# Patient Record
Sex: MALE | URBAN - METROPOLITAN AREA
[De-identification: names, ages, dates, MRNs, and addresses within clinical notes are randomized per-mention and may not be internally consistent; named-entity substitution may affect disease eponyms.]

---

## 2023-05-13 DIAGNOSIS — E11.9 TYPE 2 DIABETES MELLITUS WITHOUT COMPLICATIONS (MULTI): ICD-10-CM

## 2023-05-15 RX ORDER — FLASH GLUCOSE SCANNING READER
EACH MISCELLANEOUS
COMMUNITY
Start: 2023-01-19 | End: 2024-05-09 | Stop reason: ALTCHOICE

## 2023-05-15 RX ORDER — METFORMIN HYDROCHLORIDE 500 MG/1
500 TABLET ORAL
COMMUNITY
End: 2023-08-17 | Stop reason: SDUPTHER

## 2023-05-15 RX ORDER — FLASH GLUCOSE SENSOR
KIT MISCELLANEOUS
COMMUNITY
Start: 2023-04-23 | End: 2023-07-10 | Stop reason: SDUPTHER

## 2023-05-15 RX ORDER — GLIPIZIDE 10 MG/1
TABLET ORAL
COMMUNITY
End: 2023-07-10 | Stop reason: SDUPTHER

## 2023-05-15 RX ORDER — BLOOD SUGAR DIAGNOSTIC
STRIP MISCELLANEOUS
COMMUNITY
Start: 2020-05-29 | End: 2023-10-26 | Stop reason: SDUPTHER

## 2023-05-15 RX ORDER — SITAGLIPTIN 100 MG/1
100 TABLET, FILM COATED ORAL DAILY
COMMUNITY
End: 2023-07-10 | Stop reason: ALTCHOICE

## 2023-05-15 RX ORDER — ATORVASTATIN CALCIUM 10 MG/1
10 TABLET, FILM COATED ORAL DAILY
COMMUNITY
Start: 2023-04-23 | End: 2023-11-01 | Stop reason: SDUPTHER

## 2023-05-15 RX ORDER — LINAGLIPTIN 5 MG/1
5 TABLET, FILM COATED ORAL DAILY PRN
COMMUNITY
Start: 2023-04-23 | End: 2023-07-10 | Stop reason: SDUPTHER

## 2023-05-15 RX ORDER — GLIPIZIDE 10 MG/1
10 TABLET ORAL
Qty: 180 TABLET | Refills: 0 | Status: SHIPPED | OUTPATIENT
Start: 2023-05-15 | End: 2023-08-11

## 2023-05-15 RX ORDER — EPINEPHRINE 0.22MG
1 AEROSOL WITH ADAPTER (ML) INHALATION DAILY
COMMUNITY

## 2023-05-15 NOTE — TELEPHONE ENCOUNTER
Requested Prescriptions     Pending Prescriptions Disp Refills    glipiZIDE (Glucotrol) 10 mg tablet [Pharmacy Med Name: glipizide 10 mg tablet] 60 tablet 0     Sig: Take 1 tablet (10 mg) by mouth 2 times a day before meals.

## 2023-07-10 ENCOUNTER — OFFICE VISIT (OUTPATIENT)
Dept: PRIMARY CARE | Facility: CLINIC | Age: 63
End: 2023-07-10
Payer: COMMERCIAL

## 2023-07-10 ENCOUNTER — LAB (OUTPATIENT)
Dept: LAB | Facility: LAB | Age: 63
End: 2023-07-10
Payer: COMMERCIAL

## 2023-07-10 VITALS
HEART RATE: 67 BPM | OXYGEN SATURATION: 97 % | TEMPERATURE: 97.5 F | DIASTOLIC BLOOD PRESSURE: 83 MMHG | WEIGHT: 186.2 LBS | SYSTOLIC BLOOD PRESSURE: 129 MMHG | BODY MASS INDEX: 27.5 KG/M2 | RESPIRATION RATE: 16 BRPM

## 2023-07-10 DIAGNOSIS — E11.9 TYPE 2 DIABETES MELLITUS WITHOUT COMPLICATION, WITHOUT LONG-TERM CURRENT USE OF INSULIN (MULTI): ICD-10-CM

## 2023-07-10 DIAGNOSIS — E11.9 TYPE 2 DIABETES MELLITUS WITHOUT COMPLICATION, WITHOUT LONG-TERM CURRENT USE OF INSULIN (MULTI): Primary | ICD-10-CM

## 2023-07-10 LAB
ESTIMATED AVERAGE GLUCOSE FOR HBA1C: 148 MG/DL
HEMOGLOBIN A1C/HEMOGLOBIN TOTAL IN BLOOD: 6.8 %

## 2023-07-10 PROCEDURE — 99213 OFFICE O/P EST LOW 20 MIN: CPT | Performed by: FAMILY MEDICINE

## 2023-07-10 PROCEDURE — 36415 COLL VENOUS BLD VENIPUNCTURE: CPT

## 2023-07-10 PROCEDURE — 83036 HEMOGLOBIN GLYCOSYLATED A1C: CPT

## 2023-07-10 RX ORDER — FLASH GLUCOSE SENSOR
KIT MISCELLANEOUS
Qty: 6 EACH | Refills: 1 | Status: SHIPPED | OUTPATIENT
Start: 2023-07-10 | End: 2023-09-18 | Stop reason: SDUPTHER

## 2023-07-10 RX ORDER — LINAGLIPTIN 5 MG/1
5 TABLET, FILM COATED ORAL DAILY
Qty: 90 TABLET | Refills: 1 | Status: SHIPPED | OUTPATIENT
Start: 2023-07-10 | End: 2024-01-22 | Stop reason: SDUPTHER

## 2023-07-10 NOTE — PROGRESS NOTES
Subjective   Patient ID: Haroon Snowden is a 63 y.o. male who presents for Follow-up (dm).    HPI   Patient comes in today for 6-month follow-up and refill of medications.  He currently is without complaints.  He is using the freestyle anselmo and claims his sugars have been doing quite well.  He is otherwise without complaints.        1/19/2023  Patient comes in today for physical exam. He currently is without complaints. He has a new health insurance as of the beginning of the month. He is doing a lot of extra work at work as he lost 2 coworkers as well as his boss. He is having to  the work for all 3 of them.    Patient would like to try intermittent fasting and a continuous glucose monitor.    11/28/2022  Patient comes in today for checkup and refill of his medication. He is scheduled to come back next month for physical exam. He currently is without complaints and would like to get his lab work done. He is not fasting today.    5/23/2022  Patient presents today for 6-month checkup and refill of meds. He currently is without complaints. He states that he is taking his medicines as directed and is not having any side effects from them. He just got back from a weekend visiting a 100-year-old aunt in [place].    11/10/2021  Patient comes in today for physical exam. He has had some issues with his urine and some diarrhea in the past few weeks but they seem to be subsiding. He tried to give a urine today but was unsuccessful. Patient is also concerned about possible hearing issues.    8/11/2021  Patient presents today for 6-month checkup and refill of meds. He currently is without complaints. He states that he is taking his medicines as directed and is not having any side effects from them. His hemoglobin A1c today is 8.3% down slightly from the 8.8 back in February. He has not been taking the Metformin, will add this back.     02/04/2021  Patient comes in today for follow-up on his diabetes and prostate. He  feels his sugars have been about 20% higher on average than usual due to lack of Januvia which he ran out of and also eating more carbs than usual and not getting exercise like he normally would due to Covid. He otherwise is without complaints.    11/2/2020  Patient comes in today for a physical exam. He states that around Labor Day and then 2 weeks ago and now he has been having some problems with diarrhea, bloating, gas and nausea. He doesn't know why. He denies fever or chills.    6/17/2020  Patient presents today for his a1c check and foot lump on his right foot. His A1c today is doing better at 7.9% down from 10% back in February. He is feeling better. Overall he has been doing well and so far has not been affected by the coronavirus. He had twin granddaughters born at the end of April.     4/13/2020  Patient is seen today as a telemedicine visit rendered via realtime interactive audio/video [email or web address] services as we are currently in the middle of a coronavirus pandemic worldwide with stay-at-home orders by the government. Patient presents today for checkup and refill of meds. He currently is without complaints. He states that he is taking his medicines as directed and is not having any side effects from them. He states the new medication changes have improved his blood sugars to 160-170 in the morning and  in the evening. He just had a hemoglobin A1c of 10.0 in February. We'll recheck it again in 3 months when hopefully the pandemic has quieted.    2/10/2020  Patient comes in today complaining of upper respiratory symptoms. He's had sneezing and a semi-productive cough for about a week now. He has been using Tessalon and Mucinex DM with little relief. He also has some grumbling in his stomach but no nausea or diarrhea. He denies earache, sore throat or fever.    Patient is also here today to have his sugars checked. They have been running high at home usually over 200 in the morning. He admits that he  "hasn't been watching his diet.    10/30/19  Patient comes in today for a wellness physical exam. He is currently without complaints. He has a new grandson. He recently moved his office at work. He claims his sugars have been higher than usual because of poor food choices. He claims it has been a \"decent exercise year\". His weight has been holding in the 180-186 range.     Review of Systems   All other systems reviewed and are negative.      Objective   /83   Pulse 67   Temp 36.4 °C (97.5 °F)   Resp 16   Wt 98.9 kg (218 lb)   SpO2 97%   BMI 32.19 kg/m²     Physical Exam  Vitals reviewed.   Constitutional:       Appearance: He is well-developed.   HENT:      Head: Normocephalic and atraumatic.      Right Ear: Tympanic membrane, ear canal and external ear normal.      Left Ear: Tympanic membrane, ear canal and external ear normal.      Nose: Nose normal.      Mouth/Throat:      Mouth: Mucous membranes are moist.      Pharynx: Oropharynx is clear.   Eyes:      Extraocular Movements: Extraocular movements intact.      Conjunctiva/sclera: Conjunctivae normal.      Pupils: Pupils are equal, round, and reactive to light.   Cardiovascular:      Rate and Rhythm: Normal rate and regular rhythm.      Heart sounds: Normal heart sounds. No murmur heard.  Pulmonary:      Effort: Pulmonary effort is normal.      Breath sounds: Normal breath sounds. No wheezing.   Abdominal:      General: Abdomen is flat. Bowel sounds are normal.      Palpations: Abdomen is soft.   Musculoskeletal:         General: Normal range of motion.   Skin:     General: Skin is warm and dry.   Neurological:      General: No focal deficit present.      Mental Status: He is alert and oriented to person, place, and time. Mental status is at baseline.   Psychiatric:         Mood and Affect: Mood normal.         Behavior: Behavior normal.         Thought Content: Thought content normal.         Judgment: Judgment normal.         Assessment/Plan "   Problem List Items Addressed This Visit    None  Visit Diagnoses       Type 2 diabetes mellitus without complication, without long-term current use of insulin (CMS/[facility])    -  Primary    Relevant Medications    linaGLIPtin (Tradjenta) 5 mg tablet    FreeStyle Med sensor system (FreeStyle Med 2 Sensor) kit    Other Relevant Orders    Hemoglobin A1C        Will contact patient with lab results when available.  Return to clinic in the future for complete physical exam and fasting labs.  Medication list reconciled.  Thank you for visiting today!      Professional services: Some of this note was completed using Dragon voice technology and sometimes the software misinterprets words. This may include unintended errors with respect to translation of words, typographical errors or grammar errors which may not have been identified prior to finalization of the chart note. Please take this into account when reading the note. Thank you.

## 2023-08-10 DIAGNOSIS — E11.9 TYPE 2 DIABETES MELLITUS WITHOUT COMPLICATIONS (MULTI): ICD-10-CM

## 2023-08-11 RX ORDER — GLIPIZIDE 10 MG/1
10 TABLET ORAL
Qty: 180 TABLET | Refills: 1 | Status: SHIPPED | OUTPATIENT
Start: 2023-08-11 | End: 2024-01-22 | Stop reason: SDUPTHER

## 2023-08-11 NOTE — TELEPHONE ENCOUNTER
Requested Prescriptions     Pending Prescriptions Disp Refills    glipiZIDE (Glucotrol) 10 mg tablet [Pharmacy Med Name: glipizide 10 mg tablet] 180 tablet 1     Sig: Take 1 tablet (10 mg) by mouth 2 times a day before meals.

## 2023-08-17 DIAGNOSIS — E11.9 TYPE 2 DIABETES MELLITUS WITHOUT COMPLICATION, WITHOUT LONG-TERM CURRENT USE OF INSULIN (MULTI): Primary | ICD-10-CM

## 2023-08-17 RX ORDER — METFORMIN HYDROCHLORIDE 500 MG/1
500 TABLET ORAL
Qty: 180 TABLET | Refills: 1 | Status: SHIPPED | OUTPATIENT
Start: 2023-08-17 | End: 2024-01-22 | Stop reason: SDUPTHER

## 2023-08-17 NOTE — TELEPHONE ENCOUNTER
Requested Prescriptions     Pending Prescriptions Disp Refills    metFORMIN (Glucophage) 500 mg tablet 180 tablet 1     Sig: Take 1 tablet (500 mg) by mouth 2 times a day after meals.

## 2023-08-17 NOTE — TELEPHONE ENCOUNTER
Patient is requesting a refill on his     Metformin  500 mg     Sent to [facility] in [place]    Thank You

## 2023-09-18 ENCOUNTER — TELEPHONE (OUTPATIENT)
Dept: PRIMARY CARE | Facility: CLINIC | Age: 63
End: 2023-09-18
Payer: COMMERCIAL

## 2023-09-18 DIAGNOSIS — E11.9 TYPE 2 DIABETES MELLITUS WITHOUT COMPLICATION, WITHOUT LONG-TERM CURRENT USE OF INSULIN (MULTI): ICD-10-CM

## 2023-09-18 RX ORDER — FLASH GLUCOSE SENSOR
KIT MISCELLANEOUS
Qty: 6 EACH | Refills: 1 | Status: SHIPPED | OUTPATIENT
Start: 2023-09-18 | End: 2024-03-12

## 2023-09-18 NOTE — TELEPHONE ENCOUNTER
Patient is requesting a refill on his       Freestyle Med 2 sensor      Sent to [facility] in [place]      Thank You

## 2023-09-18 NOTE — TELEPHONE ENCOUNTER
Requested Prescriptions     Pending Prescriptions Disp Refills    FreeStyle Med sensor system (FreeStyle Med 2 Sensor) kit 6 each 1     Sig: use 1 sensor as directed every 14 days

## 2023-10-26 DIAGNOSIS — E11.9 TYPE 2 DIABETES MELLITUS WITHOUT COMPLICATION, WITHOUT LONG-TERM CURRENT USE OF INSULIN (MULTI): ICD-10-CM

## 2023-10-26 RX ORDER — BLOOD SUGAR DIAGNOSTIC
STRIP MISCELLANEOUS
Qty: 100 STRIP | Refills: 3 | Status: SHIPPED | OUTPATIENT
Start: 2023-10-26

## 2023-10-26 NOTE — TELEPHONE ENCOUNTER
Requested Prescriptions     Pending Prescriptions Disp Refills    blood sugar diagnostic (Accu-Chek Guide test strips) strip 100 strip 3     Sig: USE TO TEST BLOOD SUGAR TWICE DAILY

## 2023-10-26 NOTE — TELEPHONE ENCOUNTER
PT needs refill for...    Accu-Chek Guide test strips strip       Please send to [facility]    Thank you

## 2023-11-01 ENCOUNTER — CLINICAL SUPPORT (OUTPATIENT)
Dept: PRIMARY CARE | Facility: CLINIC | Age: 63
End: 2023-11-01
Payer: COMMERCIAL

## 2023-11-01 DIAGNOSIS — Z23 INFLUENZA VACCINE NEEDED: ICD-10-CM

## 2023-11-01 DIAGNOSIS — E78.5 HYPERLIPIDEMIA, UNSPECIFIED HYPERLIPIDEMIA TYPE: ICD-10-CM

## 2023-11-01 PROCEDURE — 90686 IIV4 VACC NO PRSV 0.5 ML IM: CPT | Performed by: FAMILY MEDICINE

## 2023-11-01 PROCEDURE — 90471 IMMUNIZATION ADMIN: CPT | Performed by: FAMILY MEDICINE

## 2023-11-01 NOTE — TELEPHONE ENCOUNTER
Patient requests prescription below    Last Office Visit: 7/10/23    Patient also requesting annual lab orders placed so he can have drawn prior to his physical in january.    Please review and advise    Requested Prescriptions     Pending Prescriptions Disp Refills    atorvastatin (Lipitor) 10 mg tablet 30 tablet 2     Sig: Take 1 tablet (10 mg) by mouth once daily.

## 2023-11-02 RX ORDER — ATORVASTATIN CALCIUM 10 MG/1
10 TABLET, FILM COATED ORAL DAILY
Qty: 30 TABLET | Refills: 2 | Status: SHIPPED | OUTPATIENT
Start: 2023-11-02 | End: 2024-05-09 | Stop reason: SDUPTHER

## 2023-11-07 ENCOUNTER — APPOINTMENT (OUTPATIENT)
Dept: PRIMARY CARE | Facility: CLINIC | Age: 63
End: 2023-11-07
Payer: COMMERCIAL

## 2023-12-12 ENCOUNTER — TELEPHONE (OUTPATIENT)
Dept: PRIMARY CARE | Facility: CLINIC | Age: 63
End: 2023-12-12
Payer: COMMERCIAL

## 2023-12-12 DIAGNOSIS — E55.9 VITAMIN D DEFICIENCY: ICD-10-CM

## 2023-12-12 DIAGNOSIS — E78.5 HYPERLIPIDEMIA, UNSPECIFIED HYPERLIPIDEMIA TYPE: Primary | ICD-10-CM

## 2023-12-12 DIAGNOSIS — N40.0 BENIGN PROSTATIC HYPERPLASIA, UNSPECIFIED WHETHER LOWER URINARY TRACT SYMPTOMS PRESENT: ICD-10-CM

## 2023-12-12 DIAGNOSIS — E53.8 VITAMIN B12 DEFICIENCY: ICD-10-CM

## 2023-12-12 DIAGNOSIS — E11.9 TYPE 2 DIABETES MELLITUS WITHOUT COMPLICATION, WITHOUT LONG-TERM CURRENT USE OF INSULIN (MULTI): ICD-10-CM

## 2023-12-12 NOTE — TELEPHONE ENCOUNTER
Patient has an appointment in January and would like to get his labs drawn before that appointment so they can be discussed at the appointment.  Patient can be reached at [phone].      Thank You

## 2023-12-18 ENCOUNTER — LAB (OUTPATIENT)
Dept: LAB | Facility: LAB | Age: 63
End: 2023-12-18
Payer: COMMERCIAL

## 2023-12-18 DIAGNOSIS — E11.9 TYPE 2 DIABETES MELLITUS WITHOUT COMPLICATION, WITHOUT LONG-TERM CURRENT USE OF INSULIN (MULTI): ICD-10-CM

## 2023-12-18 DIAGNOSIS — N40.0 BENIGN PROSTATIC HYPERPLASIA, UNSPECIFIED WHETHER LOWER URINARY TRACT SYMPTOMS PRESENT: ICD-10-CM

## 2023-12-18 DIAGNOSIS — E55.9 VITAMIN D DEFICIENCY: ICD-10-CM

## 2023-12-18 DIAGNOSIS — E53.8 VITAMIN B12 DEFICIENCY: ICD-10-CM

## 2023-12-18 DIAGNOSIS — E78.5 HYPERLIPIDEMIA, UNSPECIFIED HYPERLIPIDEMIA TYPE: ICD-10-CM

## 2023-12-18 LAB
25(OH)D3 SERPL-MCNC: 80 NG/ML (ref 30–100)
ALBUMIN SERPL BCP-MCNC: 4.5 G/DL (ref 3.4–5)
ALP SERPL-CCNC: 41 U/L (ref 33–136)
ALT SERPL W P-5'-P-CCNC: 34 U/L (ref 10–52)
ANION GAP SERPL CALC-SCNC: 9 MMOL/L (ref 10–20)
AST SERPL W P-5'-P-CCNC: 27 U/L (ref 9–39)
BILIRUB SERPL-MCNC: 0.5 MG/DL (ref 0–1.2)
BUN SERPL-MCNC: 12 MG/DL (ref 6–23)
CALCIUM SERPL-MCNC: 9.3 MG/DL (ref 8.6–10.3)
CHLORIDE SERPL-SCNC: 98 MMOL/L (ref 98–107)
CHOLEST SERPL-MCNC: 156 MG/DL (ref 0–199)
CHOLESTEROL/HDL RATIO: 3.3
CO2 SERPL-SCNC: 31 MMOL/L (ref 21–32)
CREAT SERPL-MCNC: 0.88 MG/DL (ref 0.5–1.3)
CREAT UR-MCNC: 82.9 MG/DL (ref 20–370)
ERYTHROCYTE [DISTWIDTH] IN BLOOD BY AUTOMATED COUNT: 12.4 % (ref 11.5–14.5)
EST. AVERAGE GLUCOSE BLD GHB EST-MCNC: 140 MG/DL
GFR SERPL CREATININE-BSD FRML MDRD: >90 ML/MIN/1.73M*2
GLUCOSE SERPL-MCNC: 139 MG/DL (ref 74–99)
HBA1C MFR BLD: 6.5 %
HCT VFR BLD AUTO: 41.1 % (ref 41–52)
HDLC SERPL-MCNC: 47.1 MG/DL
HGB BLD-MCNC: 14 G/DL (ref 13.5–17.5)
LDLC SERPL CALC-MCNC: 96 MG/DL
MCH RBC QN AUTO: 30.2 PG (ref 26–34)
MCHC RBC AUTO-ENTMCNC: 34.1 G/DL (ref 32–36)
MCV RBC AUTO: 89 FL (ref 80–100)
MICROALBUMIN UR-MCNC: <7 MG/L
MICROALBUMIN/CREAT UR: NORMAL MG/G{CREAT}
NON HDL CHOLESTEROL: 109 MG/DL (ref 0–149)
NRBC BLD-RTO: 0 /100 WBCS (ref 0–0)
PLATELET # BLD AUTO: 269 X10*3/UL (ref 150–450)
POTASSIUM SERPL-SCNC: 5.4 MMOL/L (ref 3.5–5.3)
PROT SERPL-MCNC: 6.8 G/DL (ref 6.4–8.2)
PSA SERPL-MCNC: 0.27 NG/ML
RBC # BLD AUTO: 4.64 X10*6/UL (ref 4.5–5.9)
SODIUM SERPL-SCNC: 133 MMOL/L (ref 136–145)
TRIGL SERPL-MCNC: 65 MG/DL (ref 0–149)
VIT B12 SERPL-MCNC: 286 PG/ML (ref 211–911)
VLDL: 13 MG/DL (ref 0–40)
WBC # BLD AUTO: 8.1 X10*3/UL (ref 4.4–11.3)

## 2023-12-18 PROCEDURE — 85027 COMPLETE CBC AUTOMATED: CPT

## 2023-12-18 PROCEDURE — 82570 ASSAY OF URINE CREATININE: CPT

## 2023-12-18 PROCEDURE — 80053 COMPREHEN METABOLIC PANEL: CPT

## 2023-12-18 PROCEDURE — 82043 UR ALBUMIN QUANTITATIVE: CPT

## 2023-12-18 PROCEDURE — 36415 COLL VENOUS BLD VENIPUNCTURE: CPT

## 2023-12-18 PROCEDURE — 80061 LIPID PANEL: CPT

## 2023-12-18 PROCEDURE — 83036 HEMOGLOBIN GLYCOSYLATED A1C: CPT

## 2023-12-18 PROCEDURE — 82607 VITAMIN B-12: CPT

## 2023-12-18 PROCEDURE — 82306 VITAMIN D 25 HYDROXY: CPT

## 2023-12-18 PROCEDURE — 84153 ASSAY OF PSA TOTAL: CPT

## 2023-12-21 ENCOUNTER — TELEPHONE (OUTPATIENT)
Dept: PRIMARY CARE | Facility: CLINIC | Age: 63
End: 2023-12-21
Payer: COMMERCIAL

## 2023-12-21 NOTE — TELEPHONE ENCOUNTER
Patient states that he would like to speak with the doctor about the treatments that was recommended in the my chart chaya from the doctor before he decides on one.  Patient can be reached at [phone].        Thank You

## 2023-12-22 DIAGNOSIS — E53.8 VITAMIN B12 DEFICIENCY: Primary | ICD-10-CM

## 2023-12-22 RX ORDER — CYANOCOBALAMIN 1000 UG/ML
1000 INJECTION, SOLUTION INTRAMUSCULAR; SUBCUTANEOUS
Qty: 4 ML | Refills: 0 | Status: SHIPPED | OUTPATIENT
Start: 2023-12-22

## 2023-12-22 NOTE — TELEPHONE ENCOUNTER
I spoke with patient this afternoon and he would like to try giving himself the B12 shots at home.  Prescription sent to his pharmacy.

## 2024-01-22 ENCOUNTER — OFFICE VISIT (OUTPATIENT)
Dept: PRIMARY CARE | Facility: CLINIC | Age: 64
End: 2024-01-22
Payer: COMMERCIAL

## 2024-01-22 VITALS
HEART RATE: 68 BPM | RESPIRATION RATE: 18 BRPM | SYSTOLIC BLOOD PRESSURE: 115 MMHG | DIASTOLIC BLOOD PRESSURE: 77 MMHG | TEMPERATURE: 97.8 F | HEIGHT: 70 IN | WEIGHT: 183.8 LBS | BODY MASS INDEX: 26.31 KG/M2

## 2024-01-22 DIAGNOSIS — E53.8 VITAMIN B12 DEFICIENCY: ICD-10-CM

## 2024-01-22 DIAGNOSIS — Z00.00 ENCOUNTER FOR HEALTH MAINTENANCE EXAMINATION: Primary | ICD-10-CM

## 2024-01-22 DIAGNOSIS — E78.5 HYPERLIPIDEMIA, UNSPECIFIED HYPERLIPIDEMIA TYPE: ICD-10-CM

## 2024-01-22 DIAGNOSIS — E11.9 TYPE 2 DIABETES MELLITUS WITHOUT COMPLICATION, WITHOUT LONG-TERM CURRENT USE OF INSULIN (MULTI): ICD-10-CM

## 2024-01-22 PROCEDURE — 99396 PREV VISIT EST AGE 40-64: CPT | Performed by: FAMILY MEDICINE

## 2024-01-22 RX ORDER — METFORMIN HYDROCHLORIDE 500 MG/1
500 TABLET ORAL
Qty: 180 TABLET | Refills: 1 | Status: SHIPPED | OUTPATIENT
Start: 2024-01-22 | End: 2024-07-20

## 2024-01-22 RX ORDER — LINAGLIPTIN 5 MG/1
5 TABLET, FILM COATED ORAL DAILY
Qty: 90 TABLET | Refills: 1 | Status: SHIPPED | OUTPATIENT
Start: 2024-01-22 | End: 2024-04-10 | Stop reason: DRUGHIGH

## 2024-01-22 RX ORDER — GLIPIZIDE 10 MG/1
10 TABLET ORAL
Qty: 180 TABLET | Refills: 1 | Status: SHIPPED | OUTPATIENT
Start: 2024-01-22 | End: 2024-07-20

## 2024-01-22 ASSESSMENT — ENCOUNTER SYMPTOMS
OCCASIONAL FEELINGS OF UNSTEADINESS: 0
DEPRESSION: 0
LOSS OF SENSATION IN FEET: 0

## 2024-01-22 ASSESSMENT — PATIENT HEALTH QUESTIONNAIRE - PHQ9
1. LITTLE INTEREST OR PLEASURE IN DOING THINGS: NOT AT ALL
SUM OF ALL RESPONSES TO PHQ9 QUESTIONS 1 AND 2: 0
2. FEELING DOWN, DEPRESSED OR HOPELESS: NOT AT ALL

## 2024-01-22 ASSESSMENT — COLUMBIA-SUICIDE SEVERITY RATING SCALE - C-SSRS
2. HAVE YOU ACTUALLY HAD ANY THOUGHTS OF KILLING YOURSELF?: NO
6. HAVE YOU EVER DONE ANYTHING, STARTED TO DO ANYTHING, OR PREPARED TO DO ANYTHING TO END YOUR LIFE?: NO
1. IN THE PAST MONTH, HAVE YOU WISHED YOU WERE DEAD OR WISHED YOU COULD GO TO SLEEP AND NOT WAKE UP?: NO

## 2024-01-22 NOTE — PROGRESS NOTES
"Subjective   Patient ID: Haroon Snowden is a 63 y.o. male who presents for Annual Exam.    HPI   Patient comes in today for annual physical exam.  He is currently without complaints.  He had his lab work done last month all of which was good except for his B12 level for which she is doing replacement.  His sugars have been doing well.    7/10/2023  Patient comes in today for 6-month follow-up and refill of medications.  He currently is without complaints.  He is using the freestyle anselmo and claims his sugars have been doing quite well.  He is otherwise without complaints.    Review of Systems   All other systems reviewed and are negative.    Objective   /77   Pulse 68   Temp 36.6 °C (97.8 °F)   Resp 18   Ht 1.778 m (5' 10\")   Wt 83.4 kg (183 lb 12.8 oz)   BMI 26.37 kg/m²     Physical Exam  Vitals reviewed.   Constitutional:       Appearance: He is well-developed.   HENT:      Head: Normocephalic and atraumatic.      Right Ear: Tympanic membrane, ear canal and external ear normal.      Left Ear: Tympanic membrane, ear canal and external ear normal.      Nose: Nose normal.      Mouth/Throat:      Mouth: Mucous membranes are moist.      Pharynx: Oropharynx is clear.   Eyes:      Extraocular Movements: Extraocular movements intact.      Conjunctiva/sclera: Conjunctivae normal.      Pupils: Pupils are equal, round, and reactive to light.      Comments: Patient wears glasses   Cardiovascular:      Rate and Rhythm: Normal rate and regular rhythm.      Heart sounds: Normal heart sounds. No murmur heard.  Pulmonary:      Effort: Pulmonary effort is normal.      Breath sounds: Normal breath sounds. No wheezing.   Abdominal:      General: Abdomen is flat. Bowel sounds are normal.      Palpations: Abdomen is soft.   Musculoskeletal:         General: Normal range of motion.   Skin:     General: Skin is warm and dry.   Neurological:      General: No focal deficit present.      Mental Status: He is alert and oriented " to person, place, and time. Mental status is at baseline.   Psychiatric:         Mood and Affect: Mood normal.         Behavior: Behavior normal.         Thought Content: Thought content normal.         Judgment: Judgment normal.       Assessment/Plan   Problem List Items Addressed This Visit             ICD-10-CM    Diabetes mellitus, type 2 (CMS/HCC) E11.9    Relevant Medications    linaGLIPtin (Tradjenta) 5 mg tablet    metFORMIN (Glucophage) 500 mg tablet     Other Visit Diagnoses         Codes    Type 2 diabetes mellitus without complications (CMS/HCC)     E11.9    Relevant Medications    glipiZIDE (Glucotrol) 10 mg tablet        Continue current meds as directed.  Follow up in 6 months for recheck if all remains stable, sooner if problems arise.  Medication list reconciled.  Thank you for visiting today!      Professional services: Some of this note was completed using Dragon voice technology and sometimes the software misinterprets words. This may include unintended errors with respect to translation of words, typographical errors or grammar errors which may not have been identified prior to finalization of the chart note. Please take this into account when reading the note. Thank you.

## 2024-01-29 ENCOUNTER — TELEPHONE (OUTPATIENT)
Dept: PRIMARY CARE | Facility: CLINIC | Age: 64
End: 2024-01-29
Payer: COMMERCIAL

## 2024-01-29 DIAGNOSIS — U07.1 COVID-19 VIRUS INFECTION: Primary | ICD-10-CM

## 2024-01-29 RX ORDER — NIRMATRELVIR AND RITONAVIR 300-100 MG
KIT ORAL
Qty: 30 TABLET | Refills: 0 | Status: SHIPPED | OUTPATIENT
Start: 2024-01-29 | End: 2024-04-26 | Stop reason: ALTCHOICE

## 2024-01-29 NOTE — TELEPHONE ENCOUNTER
Please notify patient that the Paxlovid was sent to his [facility] pharmacy as [facility] does not carry it.  Thank you.

## 2024-02-09 DIAGNOSIS — E11.9 TYPE 2 DIABETES MELLITUS WITHOUT COMPLICATION, WITHOUT LONG-TERM CURRENT USE OF INSULIN (MULTI): ICD-10-CM

## 2024-02-12 RX ORDER — GLIPIZIDE 10 MG/1
TABLET ORAL
Qty: 180 TABLET | Refills: 1 | OUTPATIENT
Start: 2024-02-12

## 2024-02-12 NOTE — TELEPHONE ENCOUNTER
Patient called in stating that the pharmacy did not get his prescriptions for the        Glipizide 10 mg  Linagliptin 5 mg      Sent to [facility] in [place]       Thank You

## 2024-02-12 NOTE — TELEPHONE ENCOUNTER
Tradjenta needs a PA, initiated and awaiting determination. The glipizide is at the pharmacy but too early to fill when he went to .   We will notify him when the PA comes back. Thank you.

## 2024-03-11 DIAGNOSIS — E11.9 TYPE 2 DIABETES MELLITUS WITHOUT COMPLICATION, WITHOUT LONG-TERM CURRENT USE OF INSULIN (MULTI): ICD-10-CM

## 2024-03-12 RX ORDER — FLASH GLUCOSE SENSOR
KIT MISCELLANEOUS
Qty: 6 EACH | Refills: 1 | Status: SHIPPED | OUTPATIENT
Start: 2024-03-12

## 2024-03-12 NOTE — TELEPHONE ENCOUNTER
Patient also called into request a refill on his sensors.  Patient can be reached at [phone].      Thank You

## 2024-03-12 NOTE — TELEPHONE ENCOUNTER
Pharmacy requests prescription below    Last Office Visit: 1/22/2024     Requested Prescriptions     Pending Prescriptions Disp Refills    FreeStyle Med 2 Sensor kit  1     Sig: use 1 sensor as directed every 14 days

## 2024-03-20 DIAGNOSIS — E11.9 TYPE 2 DIABETES MELLITUS WITHOUT COMPLICATION, WITHOUT LONG-TERM CURRENT USE OF INSULIN (MULTI): Primary | ICD-10-CM

## 2024-03-20 RX ORDER — BLOOD-GLUCOSE SENSOR
EACH MISCELLANEOUS
Qty: 9 EACH | Refills: 1 | Status: SHIPPED | OUTPATIENT
Start: 2024-03-20 | End: 2024-05-09 | Stop reason: ALTCHOICE

## 2024-03-20 RX ORDER — BLOOD-GLUCOSE,RECEIVER,CONT
EACH MISCELLANEOUS
Qty: 1 EACH | Refills: 0 | Status: SHIPPED | OUTPATIENT
Start: 2024-03-20 | End: 2024-05-09 | Stop reason: ALTCHOICE

## 2024-04-03 ENCOUNTER — TELEPHONE (OUTPATIENT)
Dept: PRIMARY CARE | Facility: CLINIC | Age: 64
End: 2024-04-03
Payer: COMMERCIAL

## 2024-04-03 DIAGNOSIS — E87.6 HYPOKALEMIA: ICD-10-CM

## 2024-04-03 DIAGNOSIS — E11.9 TYPE 2 DIABETES MELLITUS WITHOUT COMPLICATION, WITHOUT LONG-TERM CURRENT USE OF INSULIN (MULTI): Primary | ICD-10-CM

## 2024-04-03 DIAGNOSIS — E53.8 VITAMIN B12 DEFICIENCY: ICD-10-CM

## 2024-04-03 NOTE — TELEPHONE ENCOUNTER
Patient has an appointment on 4/9/24 and would like to have his lab work drawn before then so that it can be discussed at his appointment.  Patient can be reached at [phone].      Thank You

## 2024-04-03 NOTE — TELEPHONE ENCOUNTER
Please notify patient that the lab work has been placed in his chart and he does not need to be fasting for the blood draw.  Thank you.

## 2024-04-04 ENCOUNTER — LAB (OUTPATIENT)
Dept: LAB | Facility: LAB | Age: 64
End: 2024-04-04
Payer: COMMERCIAL

## 2024-04-04 DIAGNOSIS — E53.8 VITAMIN B12 DEFICIENCY: ICD-10-CM

## 2024-04-04 DIAGNOSIS — E87.6 HYPOKALEMIA: ICD-10-CM

## 2024-04-04 DIAGNOSIS — E11.9 TYPE 2 DIABETES MELLITUS WITHOUT COMPLICATION, WITHOUT LONG-TERM CURRENT USE OF INSULIN (MULTI): ICD-10-CM

## 2024-04-04 LAB
ALBUMIN SERPL BCP-MCNC: 4.4 G/DL (ref 3.4–5)
ALP SERPL-CCNC: 48 U/L (ref 33–136)
ALT SERPL W P-5'-P-CCNC: 28 U/L (ref 10–52)
ANION GAP SERPL CALC-SCNC: 11 MMOL/L (ref 10–20)
AST SERPL W P-5'-P-CCNC: 24 U/L (ref 9–39)
BILIRUB SERPL-MCNC: 0.4 MG/DL (ref 0–1.2)
BUN SERPL-MCNC: 10 MG/DL (ref 6–23)
CALCIUM SERPL-MCNC: 9.7 MG/DL (ref 8.6–10.6)
CHLORIDE SERPL-SCNC: 96 MMOL/L (ref 98–107)
CO2 SERPL-SCNC: 31 MMOL/L (ref 21–32)
CREAT SERPL-MCNC: 0.81 MG/DL (ref 0.5–1.3)
EGFRCR SERPLBLD CKD-EPI 2021: >90 ML/MIN/1.73M*2
EST. AVERAGE GLUCOSE BLD GHB EST-MCNC: 146 MG/DL
GLUCOSE SERPL-MCNC: 77 MG/DL (ref 74–99)
HBA1C MFR BLD: 6.7 %
POTASSIUM SERPL-SCNC: 4.9 MMOL/L (ref 3.5–5.3)
PROT SERPL-MCNC: 6.9 G/DL (ref 6.4–8.2)
SODIUM SERPL-SCNC: 133 MMOL/L (ref 136–145)
VIT B12 SERPL-MCNC: 552 PG/ML (ref 211–911)

## 2024-04-04 PROCEDURE — 36415 COLL VENOUS BLD VENIPUNCTURE: CPT

## 2024-04-04 PROCEDURE — 83036 HEMOGLOBIN GLYCOSYLATED A1C: CPT

## 2024-04-04 PROCEDURE — 80053 COMPREHEN METABOLIC PANEL: CPT

## 2024-04-04 PROCEDURE — 82607 VITAMIN B-12: CPT

## 2024-04-09 ENCOUNTER — OFFICE VISIT (OUTPATIENT)
Dept: PRIMARY CARE | Facility: CLINIC | Age: 64
End: 2024-04-09
Payer: COMMERCIAL

## 2024-04-09 VITALS
HEART RATE: 64 BPM | TEMPERATURE: 97.7 F | DIASTOLIC BLOOD PRESSURE: 78 MMHG | SYSTOLIC BLOOD PRESSURE: 127 MMHG | OXYGEN SATURATION: 95 % | RESPIRATION RATE: 16 BRPM | WEIGHT: 182 LBS | BODY MASS INDEX: 26.11 KG/M2

## 2024-04-09 DIAGNOSIS — E53.8 VITAMIN B12 DEFICIENCY: ICD-10-CM

## 2024-04-09 DIAGNOSIS — E78.5 HYPERLIPIDEMIA, UNSPECIFIED HYPERLIPIDEMIA TYPE: ICD-10-CM

## 2024-04-09 DIAGNOSIS — E87.1 HYPONATREMIA: ICD-10-CM

## 2024-04-09 DIAGNOSIS — E11.9 TYPE 2 DIABETES MELLITUS WITHOUT COMPLICATION, WITHOUT LONG-TERM CURRENT USE OF INSULIN (MULTI): Primary | ICD-10-CM

## 2024-04-09 PROCEDURE — 99213 OFFICE O/P EST LOW 20 MIN: CPT | Performed by: FAMILY MEDICINE

## 2024-04-09 NOTE — PROGRESS NOTES
Subjective   Patient ID: Haroon Snowden is a 63 y.o. male who presents for Follow-up (Would like to discuss some of his medications. His insurance formulary has changed. He used to be on januvia and it was dropped, then was on trajenta and that was dropped. ).    HPI  Patient presents today for 3-month checkup and refill of meds. He currently is without complaints. He states that he is taking his medicines as directed and is not having any side effects from them.  He is having some issues however with his insurance.  As of the beginning of the year he tells me that the formulary has changed again and his Tradjenta medication is not being covered now.  Previously he was on Januvia before being switched to Tradjenta again because of formulary change.  He is now confused and frustrated because the Tradjenta is not covered.  He is otherwise doing well.  He had recent lab work done which shows his hemoglobin A1c up slightly from 6.5 to now 6.7.  His sodium is still slightly low and his potassium has improved.  His B12 level was good.    1/22/2024  Patient comes in today for annual physical exam.  He is currently without complaints.  He had his lab work done last month all of which was good except for his B12 level for which she is doing replacement.  His sugars have been doing well.    7/10/2023  Patient comes in today for 6-month follow-up and refill of medications.  He currently is without complaints.  He is using the freestyle anselmo and claims his sugars have been doing quite well.  He is otherwise without complaints.    Review of Systems   All other systems reviewed and are negative.      Objective   Vitals:  /78   Pulse 64   Temp 36.5 °C (97.7 °F)   Resp 16   Wt 82.6 kg (182 lb)   SpO2 95%   BMI 26.11 kg/m²     Physical Exam  Vitals reviewed.   Constitutional:       Appearance: He is well-developed.   HENT:      Head: Normocephalic and atraumatic.      Right Ear: Tympanic membrane, ear canal and external  ear normal.      Left Ear: Tympanic membrane, ear canal and external ear normal.      Nose: Nose normal.      Mouth/Throat:      Mouth: Mucous membranes are moist.      Pharynx: Oropharynx is clear.   Eyes:      Extraocular Movements: Extraocular movements intact.      Conjunctiva/sclera: Conjunctivae normal.      Pupils: Pupils are equal, round, and reactive to light.      Comments: Patient wears glasses   Cardiovascular:      Rate and Rhythm: Normal rate and regular rhythm.      Heart sounds: Normal heart sounds. No murmur heard.  Pulmonary:      Effort: Pulmonary effort is normal.      Breath sounds: Normal breath sounds. No wheezing.   Abdominal:      General: Abdomen is flat. Bowel sounds are normal.      Palpations: Abdomen is soft.   Musculoskeletal:         General: Normal range of motion.   Skin:     General: Skin is warm and dry.   Neurological:      General: No focal deficit present.      Mental Status: He is alert and oriented to person, place, and time. Mental status is at baseline.   Psychiatric:         Mood and Affect: Mood normal.         Behavior: Behavior normal.         Thought Content: Thought content normal.         Judgment: Judgment normal.       Assessment/Plan   Problem List Items Addressed This Visit       Diabetes mellitus, type 2 (CMS/HCC) - Primary    Relevant Orders    Follow Up In [facility] - Pharmacy    Hyperlipidemia    Hyponatremia    Vitamin B12 deficiency   Continue current meds as directed.  Follow up in 6 months for recheck if all remains stable, sooner if problems arise.  Medication list reconciled.  Thank you for visiting today!      Professional services: Some of this note was completed using Dragon voice technology and sometimes the software misinterprets words. This may include unintended errors with respect to translation of words, typographical errors or grammar errors which may not have been identified prior to finalization of the chart note. Please take  this into account when reading the note. Thank you.               Nicholas Ray DO 04/10/24 6:21 AM

## 2024-04-10 PROBLEM — E87.1 HYPONATREMIA: Status: ACTIVE | Noted: 2024-04-10

## 2024-04-10 PROBLEM — E53.8 VITAMIN B12 DEFICIENCY: Status: ACTIVE | Noted: 2024-04-10

## 2024-04-10 RX ORDER — LINAGLIPTIN 5 MG/1
5 TABLET, FILM COATED ORAL DAILY
Qty: 90 TABLET | Refills: 1 | Status: SHIPPED | OUTPATIENT
Start: 2024-04-10 | End: 2024-04-26

## 2024-04-26 ENCOUNTER — TELEMEDICINE (OUTPATIENT)
Dept: PHARMACY | Facility: HOSPITAL | Age: 64
End: 2024-04-26
Payer: COMMERCIAL

## 2024-04-26 DIAGNOSIS — E11.9 TYPE 2 DIABETES MELLITUS WITHOUT COMPLICATION, WITHOUT LONG-TERM CURRENT USE OF INSULIN (MULTI): ICD-10-CM

## 2024-04-26 NOTE — PROGRESS NOTES
Subjective     Patient ID: Haroon Snowden is a 64 y.o. male who presents for diabetes management, formulary changes which DDP-4 they will cover for the year.    HPI  Patient was referred for diabetes management and cost assistance. Reports every year insurance changes formulary preference with DDP-4 class. Patient was most recently on Tradjenta before coverage for medication was ended. Patient also reports his CGM is also not being covered by his insurance anymore.    No Known Allergies    Objective     Current DM Pharmacotherapy:   -metformin 500 mg 1 po bid  -glipizide 10 mg 1 po bid    SECONDARY PREVENTION  - Statin? Yes  - ACE-I/ARB? No  - Aspirin? Yes    Current monitoring regimen:   Patient is using: continuous glucose monitor    Any episodes of hypoglycemia? Yes  Hypoglycemia awareness? Yes  Report 1 episode of nocturnal hypoglycemia per month    Pertinent PMH Review:  - PMH of Pancreatitis: No  - PMH/FH of Medullary Thyroid Cancer: No  - PMH of Retinopathy: No  - PMH of Urinary Tract Infections: No    Lab Review  Lab Results   Component Value Date    BILITOT 0.4 04/04/2024    CALCIUM 9.7 04/04/2024    CO2 31 04/04/2024    CL 96 (L) 04/04/2024    CREATININE 0.81 04/04/2024    GLUCOSE 77 04/04/2024    ALKPHOS 48 04/04/2024    K 4.9 04/04/2024    PROT 6.9 04/04/2024     (L) 04/04/2024    AST 24 04/04/2024    ALT 28 04/04/2024    BUN 10 04/04/2024    ANIONGAP 11 04/04/2024    ALBUMIN 4.4 04/04/2024    GFRMALE >90 11/29/2022     Lab Results   Component Value Date    TRIG 65 12/18/2023    CHOL 156 12/18/2023    LDLCALC 96 12/18/2023    HDL 47.1 12/18/2023     Lab Results   Component Value Date    HGBA1C 6.7 (H) 04/04/2024     The ASCVD Risk score (Frandy VOGEL, et al., 2019) failed to calculate for the following reasons:    Unable to determine if patient is Non-     Assessment/Plan   Januvia is now the preferred DPP-4 through patient's insurance, but one month supply of medication was  over $100. Whereas Jardiance was $77 for one month supply, plus copay card made medication $10. Will switch patient to Jardiance for cost savings and renal and cardiac protection with medication. Patient also reported his CGM was also not being covered by insurance. He does report at least 1 episode of nocturnal hypoglycemia once a month. He is unaware he is having low readings until CGM alerts him. Will look into coverage of CGM and start PA if appropriate by next appointment. Will follow up in 2 weeks.    Jardiance Education:     - Counseled patient on Jardiance MOA, expectations, side effects, duration of therapy, administration, and monitoring parameters.  - Reviewed the benefits of SGLT-2i therapy, such as glycemic control and kidney and CV protection.  - Advised patient to practice proper  hygiene to reduce risk of UTIs or yeast infections.  - Advised patient to maintain adequate fluid intake to remain hydrated while on SGLT2i therapy.  - Answered all patient questions and concerns.    Type 2 diabetes mellitus, is at goal. <7%    START Jardiance 25 mg 1 po daily  Follow up: I recommend diabetes care be 2 weeks.    Margarita Johnson, PharmD    Continue all meds under the continuation of care with the referring provider and clinical pharmacy team

## 2024-05-09 ENCOUNTER — TELEMEDICINE (OUTPATIENT)
Dept: PHARMACY | Facility: HOSPITAL | Age: 64
End: 2024-05-09
Payer: COMMERCIAL

## 2024-05-09 DIAGNOSIS — E78.5 HYPERLIPIDEMIA, UNSPECIFIED HYPERLIPIDEMIA TYPE: ICD-10-CM

## 2024-05-09 DIAGNOSIS — E11.9 TYPE 2 DIABETES MELLITUS WITHOUT COMPLICATION, WITHOUT LONG-TERM CURRENT USE OF INSULIN (MULTI): ICD-10-CM

## 2024-05-09 RX ORDER — ATORVASTATIN CALCIUM 10 MG/1
10 TABLET, FILM COATED ORAL DAILY
Qty: 90 TABLET | Refills: 1 | Status: SHIPPED | OUTPATIENT
Start: 2024-05-09

## 2024-05-09 RX ORDER — BLOOD-GLUCOSE SENSOR
EACH MISCELLANEOUS
Qty: 6 EACH | Refills: 3 | Status: SHIPPED | OUTPATIENT
Start: 2024-05-09

## 2024-05-09 NOTE — PROGRESS NOTES
Subjective     Patient ID: Haroon Snowden is a 64 y.o. male who presents for diabetes management, formulary changes which DDP-4 they will cover for the year.    HPI  Patient was referred for diabetes management and cost assistance. Reports every year insurance changes formulary preference with DDP-4 class. Patient was most recently on Tradjenta before coverage for medication was ended. Patient also reports his CGM is also not being covered by his insurance anymore.    No Known Allergies    Objective     Current DM Pharmacotherapy:   -metformin 500 mg 1 po bid  -glipizide 10 mg 1 po bid    SECONDARY PREVENTION  - Statin? Yes  - ACE-I/ARB? No  - Aspirin? Yes    Current monitoring regimen:   Patient is using: continuous glucose monitor    Any episodes of hypoglycemia? Yes  Hypoglycemia awareness? Yes  Report 1 episode of nocturnal hypoglycemia per month    Pertinent PMH Review:  - PMH of Pancreatitis: No  - PMH/FH of Medullary Thyroid Cancer: No  - PMH of Retinopathy: No  - PMH of Urinary Tract Infections: No    Lab Review  Lab Results   Component Value Date    BILITOT 0.4 04/04/2024    CALCIUM 9.7 04/04/2024    CO2 31 04/04/2024    CL 96 (L) 04/04/2024    CREATININE 0.81 04/04/2024    GLUCOSE 77 04/04/2024    ALKPHOS 48 04/04/2024    K 4.9 04/04/2024    PROT 6.9 04/04/2024     (L) 04/04/2024    AST 24 04/04/2024    ALT 28 04/04/2024    BUN 10 04/04/2024    ANIONGAP 11 04/04/2024    ALBUMIN 4.4 04/04/2024    GFRMALE >90 11/29/2022     Lab Results   Component Value Date    TRIG 65 12/18/2023    CHOL 156 12/18/2023    LDLCALC 96 12/18/2023    HDL 47.1 12/18/2023     Lab Results   Component Value Date    HGBA1C 6.7 (H) 04/04/2024     The ASCVD Risk score (Frandy VOGEL, et al., 2019) failed to calculate for the following reasons:    Unable to determine if patient is Non-     Assessment/Plan   Patient tolerate Jardiance with no issues. He reports BG is in goal, and he is happy with readings.  Patients wants to get A1C retested at earliest availability. Will schedule A1c to be done in July and follow up to review lab work. Ran test claims for CGM and anselmo 3 is the cheapest. Will send anselmo 3 to pharmacy for .    Type 2 diabetes mellitus, is at goal. <7%    CONTINUE Jardiance 25 mg 1 po daily  Follow up: I recommend diabetes care be 12 weeks.    Margarita Johnson, PharmD    Continue all meds under the continuation of care with the referring provider and clinical pharmacy team

## 2024-06-28 ENCOUNTER — LAB (OUTPATIENT)
Dept: LAB | Facility: LAB | Age: 64
End: 2024-06-28
Payer: COMMERCIAL

## 2024-06-28 DIAGNOSIS — E11.9 TYPE 2 DIABETES MELLITUS WITHOUT COMPLICATION, WITHOUT LONG-TERM CURRENT USE OF INSULIN (MULTI): ICD-10-CM

## 2024-06-28 LAB
EST. AVERAGE GLUCOSE BLD GHB EST-MCNC: 128 MG/DL
HBA1C MFR BLD: 6.1 %

## 2024-06-28 PROCEDURE — 83036 HEMOGLOBIN GLYCOSYLATED A1C: CPT

## 2024-06-28 PROCEDURE — 36415 COLL VENOUS BLD VENIPUNCTURE: CPT

## 2024-07-17 ENCOUNTER — APPOINTMENT (OUTPATIENT)
Dept: PHARMACY | Facility: HOSPITAL | Age: 64
End: 2024-07-17
Payer: COMMERCIAL

## 2024-07-17 DIAGNOSIS — E11.9 TYPE 2 DIABETES MELLITUS WITHOUT COMPLICATION, WITHOUT LONG-TERM CURRENT USE OF INSULIN (MULTI): ICD-10-CM

## 2024-07-24 NOTE — PROGRESS NOTES
Subjective     Patient ID: Haroon Snowden is a 64 y.o. male who presents for diabetes management, formulary changes which DDP-4 they will cover for the year.    HPI  Patient was referred for diabetes management and cost assistance. Reports every year insurance changes formulary preference with DDP-4 class. Patient was most recently on Tradjenta before coverage for medication was ended. Patient also reports his CGM is also not being covered by his insurance anymore.    No Known Allergies    Objective     Current DM Pharmacotherapy:   -metformin 500 mg 1 po bid  -glipizide 10 mg 1 po bid    SECONDARY PREVENTION  - Statin? Yes  - ACE-I/ARB? No  - Aspirin? Yes    Current monitoring regimen:   Patient is using: continuous glucose monitor    Any episodes of hypoglycemia? Yes  Hypoglycemia awareness? Yes  Report 1 episode of nocturnal hypoglycemia per month    Pertinent PMH Review:  - PMH of Pancreatitis: No  - PMH/FH of Medullary Thyroid Cancer: No  - PMH of Retinopathy: No  - PMH of Urinary Tract Infections: No    Lab Review  Lab Results   Component Value Date    BILITOT 0.4 04/04/2024    CALCIUM 9.7 04/04/2024    CO2 31 04/04/2024    CL 96 (L) 04/04/2024    CREATININE 0.81 04/04/2024    GLUCOSE 77 04/04/2024    ALKPHOS 48 04/04/2024    K 4.9 04/04/2024    PROT 6.9 04/04/2024     (L) 04/04/2024    AST 24 04/04/2024    ALT 28 04/04/2024    BUN 10 04/04/2024    ANIONGAP 11 04/04/2024    ALBUMIN 4.4 04/04/2024    GFRMALE >90 11/29/2022     Lab Results   Component Value Date    TRIG 65 12/18/2023    CHOL 156 12/18/2023    LDLCALC 96 12/18/2023    HDL 47.1 12/18/2023     Lab Results   Component Value Date    HGBA1C 6.1 (H) 06/28/2024     The ASCVD Risk score (Frandy VOGEL, et al., 2019) failed to calculate for the following reasons:    Unable to determine if patient is Non-     Assessment/Plan   Patient tolerate Jardiance with no issues. A1C is still in goal at this time. Patient would like to look  into decreasing glipizide dose. Will review Med sensor report in 4 weeks for determination.    Type 2 diabetes mellitus, is at goal. <7%    CONTINUE Jardiance 25 mg 1 po daily  Follow up: I recommend diabetes care be 4  weeks.    Margarita Johnson, PharmD    Continue all meds under the continuation of care with the referring provider and clinical pharmacy team

## 2024-07-25 DIAGNOSIS — E11.9 TYPE 2 DIABETES MELLITUS WITHOUT COMPLICATION, WITHOUT LONG-TERM CURRENT USE OF INSULIN (MULTI): ICD-10-CM

## 2024-07-25 RX ORDER — GLIPIZIDE 10 MG/1
10 TABLET ORAL
Qty: 180 TABLET | Refills: 1 | Status: SHIPPED | OUTPATIENT
Start: 2024-07-25 | End: 2025-01-21

## 2024-08-10 DIAGNOSIS — E11.9 TYPE 2 DIABETES MELLITUS WITHOUT COMPLICATION, WITHOUT LONG-TERM CURRENT USE OF INSULIN (MULTI): ICD-10-CM

## 2024-08-12 RX ORDER — METFORMIN HYDROCHLORIDE 500 MG/1
500 TABLET ORAL
Qty: 180 TABLET | Refills: 1 | Status: SHIPPED | OUTPATIENT
Start: 2024-08-12 | End: 2025-02-08

## 2024-08-12 NOTE — TELEPHONE ENCOUNTER
Pt last OV 4/9/2024    Requested Prescriptions     Pending Prescriptions Disp Refills    metFORMIN (Glucophage) 500 mg tablet [Pharmacy Med Name: metformin 500 mg tablet] 180 tablet 1     Sig: Take 1 tablet (500 mg) by mouth 2 times a day after meals.

## 2024-08-14 ENCOUNTER — APPOINTMENT (OUTPATIENT)
Dept: PHARMACY | Facility: HOSPITAL | Age: 64
End: 2024-08-14
Payer: COMMERCIAL

## 2024-08-14 DIAGNOSIS — E11.9 TYPE 2 DIABETES MELLITUS WITHOUT COMPLICATION, WITHOUT LONG-TERM CURRENT USE OF INSULIN (MULTI): ICD-10-CM

## 2024-08-22 NOTE — PROGRESS NOTES
Subjective     Patient ID: Haroon Snowden is a 64 y.o. male who presents for diabetes management, formulary changes which DDP-4 they will cover for the year.    HPI  Patient was referred for diabetes management and cost assistance. Reports every year insurance changes formulary preference with DDP-4 class. Patient was most recently on Tradjenta before coverage for medication was ended. Patient also reports his CGM is also not being covered by his insurance anymore.    No Known Allergies    Objective     Current DM Pharmacotherapy:   -metformin 500 mg 1 po bid  -glipizide 10 mg 1 po bid    SECONDARY PREVENTION  - Statin? Yes  - ACE-I/ARB? No  - Aspirin? Yes    Current monitoring regimen:   Patient is using: continuous glucose monitor    Any episodes of hypoglycemia? Yes  Hypoglycemia awareness? Yes  Report 1 episode of nocturnal hypoglycemia per month    Pertinent PMH Review:  - PMH of Pancreatitis: No  - PMH/FH of Medullary Thyroid Cancer: No  - PMH of Retinopathy: No  - PMH of Urinary Tract Infections: No    Lab Review  Lab Results   Component Value Date    BILITOT 0.4 04/04/2024    CALCIUM 9.7 04/04/2024    CO2 31 04/04/2024    CL 96 (L) 04/04/2024    CREATININE 0.81 04/04/2024    GLUCOSE 77 04/04/2024    ALKPHOS 48 04/04/2024    K 4.9 04/04/2024    PROT 6.9 04/04/2024     (L) 04/04/2024    AST 24 04/04/2024    ALT 28 04/04/2024    BUN 10 04/04/2024    ANIONGAP 11 04/04/2024    ALBUMIN 4.4 04/04/2024    GFRMALE >90 11/29/2022     Lab Results   Component Value Date    TRIG 65 12/18/2023    CHOL 156 12/18/2023    LDLCALC 96 12/18/2023    HDL 47.1 12/18/2023     Lab Results   Component Value Date    HGBA1C 6.1 (H) 06/28/2024     The 10-year ASCVD risk score (Frandy VOGEL, et al., 2019) is: 18.5%    Values used to calculate the score:      Age: 64 years      Sex: Male      Is Non- : No      Diabetic: Yes      Tobacco smoker: No      Systolic Blood Pressure: 127 mmHg      Is BP treated: No       HDL Cholesterol: 47.1 mg/dL      Total Cholesterol: 156 mg/dL    Assessment/Plan   Since swicthing to Jardiance, pt reports he has been having more low readings during the day. Recommend he decrease his glipizide to 10mg daily. Patient due to updated A1C in September. Will follow up then to review updated lab work and Med report.    Type 2 diabetes mellitus, is at goal. <7%    DECREASE glipizide to 10mg in the morning  Follow up: I recommend diabetes care be 6 weeks.    Margarita Johnson, PharmD    Continue all meds under the continuation of care with the referring provider and clinical pharmacy team

## 2024-09-11 ENCOUNTER — APPOINTMENT (OUTPATIENT)
Dept: PHARMACY | Facility: HOSPITAL | Age: 64
End: 2024-09-11
Payer: COMMERCIAL

## 2024-09-11 DIAGNOSIS — E11.9 TYPE 2 DIABETES MELLITUS WITHOUT COMPLICATION, WITHOUT LONG-TERM CURRENT USE OF INSULIN (MULTI): ICD-10-CM

## 2024-09-13 RX ORDER — DEXTROSE 4 G
TABLET,CHEWABLE ORAL
Qty: 1 EACH | Refills: 0 | Status: SHIPPED | OUTPATIENT
Start: 2024-09-13

## 2024-09-13 NOTE — PROGRESS NOTES
Subjective     Patient ID: Haroon Snowden is a 64 y.o. male who presents for diabetes management, formulary changes which DDP-4 they will cover for the year.    HPI  Patient was referred for diabetes management and cost assistance. Reports every year insurance changes formulary preference with DDP-4 class. Patient was most recently on Tradjenta before coverage for medication was ended. Patient also reports his CGM is also not being covered by his insurance anymore.    No Known Allergies    Objective     Current DM Pharmacotherapy:   -metformin 500 mg 1 po bid  -glipizide 10 mg 1 po bid  -Jardiance 25 mg daily    SECONDARY PREVENTION  - Statin? Yes  - ACE-I/ARB? No  - Aspirin? Yes    Current monitoring regimen:   Patient is using: continuous glucose monitor    Any episodes of hypoglycemia? Yes  Hypoglycemia awareness? Yes  Report 1 episode of nocturnal hypoglycemia per month    Pertinent PMH Review:  - PMH of Pancreatitis: No  - PMH/FH of Medullary Thyroid Cancer: No  - PMH of Retinopathy: No  - PMH of Urinary Tract Infections: No    Lab Review  Lab Results   Component Value Date    BILITOT 0.4 04/04/2024    CALCIUM 9.7 04/04/2024    CO2 31 04/04/2024    CL 96 (L) 04/04/2024    CREATININE 0.81 04/04/2024    GLUCOSE 77 04/04/2024    ALKPHOS 48 04/04/2024    K 4.9 04/04/2024    PROT 6.9 04/04/2024     (L) 04/04/2024    AST 24 04/04/2024    ALT 28 04/04/2024    BUN 10 04/04/2024    ANIONGAP 11 04/04/2024    ALBUMIN 4.4 04/04/2024    GFRMALE >90 11/29/2022     Lab Results   Component Value Date    TRIG 65 12/18/2023    CHOL 156 12/18/2023    LDLCALC 96 12/18/2023    HDL 47.1 12/18/2023     Lab Results   Component Value Date    HGBA1C 6.1 (H) 06/28/2024     The 10-year ASCVD risk score (Frandy VOGEL, et al., 2019) is: 18.5%    Values used to calculate the score:      Age: 64 years      Sex: Male      Is Non- : No      Diabetic: Yes      Tobacco smoker: No      Systolic Blood Pressure: 127 mmHg       Is BP treated: No      HDL Cholesterol: 47.1 mg/dL      Total Cholesterol: 156 mg/dL    Assessment/Plan   Patient reports lows have stopped. Patient reported BG is in goal. Patient to get updated A1C next month. Will follow up then to review updated lab work and Med report.    Type 2 diabetes mellitus, is at goal. <7%    Follow up: I recommend diabetes care be 4 weeks.    Margarita Johnson, PharmD    Continue all meds under the continuation of care with the referring provider and clinical pharmacy team

## 2024-09-26 ENCOUNTER — LAB (OUTPATIENT)
Dept: LAB | Facility: LAB | Age: 64
End: 2024-09-26
Payer: COMMERCIAL

## 2024-09-26 DIAGNOSIS — E11.9 TYPE 2 DIABETES MELLITUS WITHOUT COMPLICATION, WITHOUT LONG-TERM CURRENT USE OF INSULIN (MULTI): ICD-10-CM

## 2024-09-26 LAB
EST. AVERAGE GLUCOSE BLD GHB EST-MCNC: 128 MG/DL
HBA1C MFR BLD: 6.1 %

## 2024-09-26 PROCEDURE — 36415 COLL VENOUS BLD VENIPUNCTURE: CPT

## 2024-09-26 PROCEDURE — 83036 HEMOGLOBIN GLYCOSYLATED A1C: CPT

## 2024-10-09 ENCOUNTER — APPOINTMENT (OUTPATIENT)
Dept: PHARMACY | Facility: HOSPITAL | Age: 64
End: 2024-10-09
Payer: COMMERCIAL

## 2024-10-09 DIAGNOSIS — E11.9 TYPE 2 DIABETES MELLITUS WITHOUT COMPLICATION, WITHOUT LONG-TERM CURRENT USE OF INSULIN (MULTI): Primary | ICD-10-CM

## 2024-10-09 RX ORDER — GLIPIZIDE 10 MG/1
10 TABLET ORAL DAILY
Qty: 90 TABLET | Refills: 3 | Status: SHIPPED | OUTPATIENT
Start: 2024-10-09

## 2024-10-09 ASSESSMENT — ENCOUNTER SYMPTOMS
SWEATS: 1
TREMORS: 1

## 2024-10-09 NOTE — PROGRESS NOTES
[facility] Pharmacy Consult  Haroon Snowden is a 64 y.o. male was referred to Clinical Pharmacy Team for a Pharmacy consult.  The patient was referred for their Diabetes.    Referring Provider: Nicholas Ray,     Subjective   No Known Allergies    [id number] - [place], [place] - [address]  [address]  [facility] [address]  Phone: [phone] Fax: [phone]    [id number] - [place], [place] - [address] [facility]  [address]  [facility] [address]  Phone: [phone] Fax: [phone]    [facility] - [place] - [facility] [address] [facility]  [place] [address]  Phone: [phone] Fax: [phone]      Diabetes  He presents for his follow-up diabetic visit. He has type 2 diabetes mellitus. His disease course has been stable. Hypoglycemia symptoms include sweats and tremors. There are no hypoglycemic complications. Symptoms are stable. Risk factors for coronary artery disease include diabetes mellitus, dyslipidemia and hypertension. He is compliant with treatment most of the time. His weight is fluctuating minimally. His home blood glucose trend is fluctuating minimally. His overall blood glucose range is 130-140 mg/dl.     Reported has been reducing his glipizide to 1 tab daily, half of the time. Reported having hypoglycemia episodes -  1-2 times a week.     CGM data:    Time in range:  High: 5%  In goal:94%  Low: 1 %    Review of Systems   Neurological:  Positive for tremors.       Objective     There were no vitals taken for this visit.     LAB  Lab Results   Component Value Date    BILITOT 0.4 04/04/2024    CALCIUM 9.7 04/04/2024    CO2 31 04/04/2024    CL 96 (L) 04/04/2024    CREATININE 0.81 04/04/2024    GLUCOSE 77 04/04/2024    ALKPHOS 48 04/04/2024    K 4.9 04/04/2024    PROT 6.9 04/04/2024     (L) 04/04/2024    AST 24 04/04/2024    ALT 28  04/04/2024    BUN 10 04/04/2024    ANIONGAP 11 04/04/2024    ALBUMIN 4.4 04/04/2024    GFRMALE >90 11/29/2022     Lab Results   Component Value Date    TRIG 65 12/18/2023    CHOL 156 12/18/2023    LDLCALC 96 12/18/2023    HDL 47.1 12/18/2023     Lab Results   Component Value Date    HGBA1C 6.1 (H) 09/26/2024       Current Outpatient Medications on File Prior to Visit   Medication Sig Dispense Refill    atorvastatin (Lipitor) 10 mg tablet Take 1 tablet (10 mg) by mouth once daily. 90 tablet 1    blood sugar diagnostic (Accu-Chek Guide test strips) strip USE TO TEST BLOOD SUGAR TWICE DAILY 100 strip 3    blood-glucose meter (Accu-Chek Guide Glucose Meter) misc Use to test bg 1 each 0    coenzyme Q-10 100 mg capsule Take 1 capsule (100 mg) by mouth once daily.      cyanocobalamin (Vitamin B-12) 1,000 mcg/mL injection Inject 1 mL (1,000 mcg) into the muscle every 30 (thirty) days. 4 mL 0    empagliflozin (Jardiance) 25 mg Take 1 tablet (25 mg) by mouth once daily. 90 tablet 1    FreeStyle Med 3 Sensor device Replace every 14 days 6 each 3    glipiZIDE (Glucotrol) 10 mg tablet Take 1 tablet (10 mg) by mouth 2 times a day before meals. 180 tablet 1    metFORMIN (Glucophage) 500 mg tablet Take 1 tablet (500 mg) by mouth 2 times a day after meals. 180 tablet 1     No current facility-administered medications on file prior to visit.        HISTORICAL PHARMACOTHERAPY  -none    DRUG INTERACTIONS  - Having hypoglycemia events    SECONDARY PREVENTION  - Statin? no  - ACE-I/ARB? no    Assessment/Plan   Problem List Items Addressed This Visit       Diabetes mellitus, type 2 (Multi) - Primary     REDUCE Glipizide 10mg to 1 tabs daily  Patient have hypoglycemia and already only taking glipizde half of the time  Educate on side effects hypo- and hyperglycemia  Recommend GLP-1 for future for glucose fluctuation  FUV in 3 months to assess A1c and glucose control             Continue all meds under the continuation of care with the  referring provider and clinical pharmacy team.    Akira Calvin PharmD     Verbal consent to manage patient's drug therapy was obtained from [the patient and/or an individual authorized to act on behalf of a patient]. They were informed they may decline to participate or withdraw from participation in pharmacy services at any time.

## 2024-10-09 NOTE — Clinical Note
"TELEPHONE VISIT  Tae Carrillo is a 13 year old pt. who is being evaluated via a billable telephone visit.      The patient has been notified of the following:    We have found that certain health care needs can be provided without the need for a physical exam. This service lets us provide the care you need with a short phone conversation. If a prescription is necessary we can send it directly to your pharmacy. If lab work is needed we can place an order for that and you can then stop by our lab to have the test done at a later time. Insurers are generally covering virtual visits as they would in-office visits so billing should not be different than normal.  If for some reason you do get billed incorrectly, you should contact the billing office to correct it and that number is in the AVS .    Patient has given verbal consent for a telephone visit?:  Yes   How would the pt like to obtain the AVS?:  [employer] Smart[employer] [PsychAVS] has been placed in 'Patient Instructions':  Yes     Start Time:  3:07         End Time:  3:34    PSYCHIATRY CLINIC PROGRESS NOTE    30 minute medication management   IDENTIFICATION: Tae Carrillo is a 13 year old male with previous psychiatric diagnoses of generalized anxiety disorder and ADHD, combined type. Pt presents for ongoing psychiatric follow-up and was seen for initial diagnostic evaluation on 7/23/2019.  SUBJECTIVE / INTERIM HISTORY     The pt was last seen in clinic 2/24/2021 at which time celexa was increased. The patient reports good medication adherence. Since the last visit, he has taken his medication daily as prescribed. He denies any known side effects of the medication. Brother has moved in since graduating. Mom plans to reach back out to art therapy to see if they have an opening.     SYMPTOMS include ongoing anxiety. He reports that his anxiety is worse and he is \"cutting to relieve it\". After some discussion he has cut on one other occasion since increasing the " FUV 1/8 @ 3:30pm celexa. Duarte feels that he is more motivated and is spending more time outside since increasing celexa. Mom thinks he is more motivated than he has been in previous years. He denies SI or panic since the last visit as well as any other known safety concerns.     Current Substance Use- none. Sober support- na     MEDICAL ROS          Reports A comprehensive review of systems was performed and is negative other than noted in the HPI.    PAST MEDICATION TRIALS    Strattera- worked for focus in the past but doses are divided to minimize side effects, no longer effective so stopped  Celexa- current, seems less effective with transition back to school  Concerta, minimally effective at 27 mg  Metadate CD, current, moderately effective  MEDICAL HISTORY      Primary Care Physician: Wegener, Joel Daniel Irwin at [address] [facility] [address]  [facility] [address]     Neurologic Hx:  head injury- denies     seizure- denies      LOC- denies    other- na   Patient Active Problem List   Diagnosis     Closed fracture of lower end of humerus     Attention deficit hyperactivity disorder (ADHD)     Obesity, pediatric, BMI greater than or equal to 95th percentile for age     Severe Obesity: BMI greater than 99th percentile for age (H)      ALLERGY       Allergies   Allergen Reactions     Pollen Extract      Sunscreens [Aminobenzoate] Itching     All types of suncreen. Reaction only in face       MEDICATIONS      Current Outpatient Medications   Medication Sig     citalopram (CELEXA) 20 MG tablet Take 1.5 tablets (30 mg) by mouth daily     [START ON 4/10/2021] methylphenidate (METADATE CD) 50 MG CR capsule Take 1 capsule (50 mg) by mouth every morning     [START ON 5/10/2021] methylphenidate (METADATE CD) 50 MG CR capsule Take 1 capsule (50 mg) by mouth every morning     acetaminophen (TYLENOL) 325 MG tablet Take 1 tablet (325 mg) by mouth every 4 hours as needed for mild pain (headache)     ibuprofen (ADVIL/MOTRIN) 400 MG tablet Take 1  tablet (400 mg) by mouth every 8 hours as needed for moderate pain     melatonin 3 MG tablet Take 1 mg by mouth nightly as needed for sleep     methylphenidate (METADATE CD) 30 MG CR capsule Take 1 capsule (30 mg) by mouth daily (Patient not taking: Reported on 3/9/2021)     methylphenidate (METADATE CD) 30 MG CR capsule Take 1 capsule (30 mg) by mouth every morning (Patient not taking: Reported on 3/9/2021)     methylphenidate (METADATE CD) 40 MG CR capsule Take 1 capsule (40 mg) by mouth daily (Patient not taking: Reported on 3/9/2021)     methylphenidate (METADATE CD) 40 MG CR capsule Take 1 capsule (40 mg) by mouth daily (Patient not taking: Reported on 3/9/2021)     topiramate (TOPAMAX) 25 MG tablet Take 3 tablets (75 mg) by mouth daily     No current facility-administered medications for this visit.        Drug Interaction Check is remarkable for:  None  VITALS    There were no vitals taken for this visit.  LABS  use [employer]______       none    MENTAL STATUS EXAM     Alertness: alert  and oriented  Appearance:unable to assess by telephone  Behavior/Demeanor: unable to assess by telephone,   Speech: normal and regular rate and rhythm  Language: no problems  Psychomotor: unable to assess by telephone  Mood:  anxious  Affect: unable to assess by telephone  Thought Process/Associations: unremarkable  Thought Content: denies suicidal and violent ideation  Perception: denies auditory hallucinations and visual hallucinations  Insight: fair  Judgment: fair  Cognition: does appear grossly intact; formal cognitive testing was not done    PSYCHOLOGICAL TESTING:     none    ASSESSMENT     Tae Carrillo is a 13 year old male with psychiatric diagnoses of generalized anxiety disorder and ADHD, combined type. He was overall cooperative and engaged in the video visit. Though he initially reported increasing anxiety, he does agree with Mom's observations that he is more motivated and energized and getting outside more. Plan  ultimately made to keep medications the same and follow-up in 6 weeks. Parents informed that they may call or message with any questions or concerns or if they feel an earlier appointment is necessary.       TREATMENT RISK STATEMENT:  The risks, benefits, alternatives and potential adverse effects have been explained and are understood by the pt and pt's parent(s)/guardian.  Discussion of specific concerns included- N/A. The  pt and pt's parent(s)/guardian agrees to the treatment plan with the ability to do so. The  pt and pt's parent(s)/guardian knows to call the clinic for any problems or access emergency care if needed. There are no medical considerations relevant to treatment, as noted above. Substance use is not a problem as noted above.      Drug interaction check was done for any med changes and is discussed above.      DIAGNOSES                                                                                                      Encounter Diagnoses   Name Primary?     Attention deficit hyperactivity disorder (ADHD), combined type      TIARRA (generalized anxiety disorder)                                  PLAN                                                                                                 Medication Plan:         -- continue metadate CD 50 mg PO Q Day             2 prescriptions  sent       -- continue Celexa 30 mg PO Q Day               sent with 1 refill    Labs:  none    Pt monitor [call for probs]: nothing specific needed    THERAPY: No Change    REFERRALS [CD, medical, other]:  none    :  none    Controlled Substance Contract was not completed    RTC: 4-6 weeks    CRISIS NUMBERS: Provided in AVS upon request of patient/guardian.

## 2024-10-09 NOTE — ASSESSMENT & PLAN NOTE
REDUCE Glipizide 10mg to 1 tabs daily  Patient have hypoglycemia and already only taking glipizde half of the time  Educate on side effects hypo- and hyperglycemia  Recommend GLP-1 for future for glucose fluctuation  FUV in 3 months to assess A1c and glucose control

## 2024-11-26 ENCOUNTER — TELEPHONE (OUTPATIENT)
Dept: PRIMARY CARE | Facility: CLINIC | Age: 64
End: 2024-11-26
Payer: COMMERCIAL

## 2024-11-26 DIAGNOSIS — E11.9 TYPE 2 DIABETES MELLITUS WITHOUT COMPLICATION, WITHOUT LONG-TERM CURRENT USE OF INSULIN (MULTI): Primary | ICD-10-CM

## 2024-11-26 NOTE — TELEPHONE ENCOUNTER
Pt states his freestyle med 3 meter is on back order at his local pharmacies and is requesting the Med freestyle 2 until the 3's became available again.   Please advise.

## 2024-11-26 NOTE — TELEPHONE ENCOUNTER
Patient is calling in and asking for a order for lab work to be done before his appointment so that he can discuss them at the appointment.  Patient can be reached at [phone].        Thank You

## 2024-11-29 RX ORDER — FLASH GLUCOSE SCANNING READER
EACH MISCELLANEOUS
Qty: 1 EACH | Refills: 0 | Status: SHIPPED | OUTPATIENT
Start: 2024-11-29

## 2024-11-29 RX ORDER — FLASH GLUCOSE SENSOR
KIT MISCELLANEOUS
Qty: 1 EACH | Refills: 0 | Status: SHIPPED | OUTPATIENT
Start: 2024-11-29

## 2024-12-02 DIAGNOSIS — E53.8 VITAMIN B12 DEFICIENCY: ICD-10-CM

## 2024-12-02 DIAGNOSIS — N40.0 BENIGN PROSTATIC HYPERPLASIA, UNSPECIFIED WHETHER LOWER URINARY TRACT SYMPTOMS PRESENT: ICD-10-CM

## 2024-12-02 DIAGNOSIS — E55.9 VITAMIN D DEFICIENCY: ICD-10-CM

## 2024-12-02 DIAGNOSIS — E87.1 HYPONATREMIA: ICD-10-CM

## 2024-12-02 DIAGNOSIS — E78.5 HYPERLIPIDEMIA, UNSPECIFIED HYPERLIPIDEMIA TYPE: ICD-10-CM

## 2024-12-02 DIAGNOSIS — E11.9 TYPE 2 DIABETES MELLITUS WITHOUT COMPLICATION, WITHOUT LONG-TERM CURRENT USE OF INSULIN (MULTI): Primary | ICD-10-CM

## 2024-12-02 NOTE — TELEPHONE ENCOUNTER
All of his lab orders are in his chart.  He does not have need to have them done until next month (January 2025) when he is due for his physical.

## 2025-01-02 ENCOUNTER — LAB (OUTPATIENT)
Dept: LAB | Facility: LAB | Age: 65
End: 2025-01-02
Payer: COMMERCIAL

## 2025-01-02 DIAGNOSIS — E55.9 VITAMIN D DEFICIENCY: ICD-10-CM

## 2025-01-02 DIAGNOSIS — E11.9 TYPE 2 DIABETES MELLITUS WITHOUT COMPLICATION, WITHOUT LONG-TERM CURRENT USE OF INSULIN (MULTI): Primary | ICD-10-CM

## 2025-01-02 DIAGNOSIS — E53.8 VITAMIN B12 DEFICIENCY: ICD-10-CM

## 2025-01-02 DIAGNOSIS — E78.5 HYPERLIPIDEMIA, UNSPECIFIED HYPERLIPIDEMIA TYPE: ICD-10-CM

## 2025-01-02 DIAGNOSIS — E11.9 TYPE 2 DIABETES MELLITUS WITHOUT COMPLICATION, WITHOUT LONG-TERM CURRENT USE OF INSULIN (MULTI): ICD-10-CM

## 2025-01-02 DIAGNOSIS — N40.0 BENIGN PROSTATIC HYPERPLASIA, UNSPECIFIED WHETHER LOWER URINARY TRACT SYMPTOMS PRESENT: ICD-10-CM

## 2025-01-02 LAB
25(OH)D3 SERPL-MCNC: 76 NG/ML (ref 30–100)
ALBUMIN SERPL BCP-MCNC: 4.6 G/DL (ref 3.4–5)
ALP SERPL-CCNC: 45 U/L (ref 33–136)
ALT SERPL W P-5'-P-CCNC: 32 U/L (ref 10–52)
ANION GAP SERPL CALC-SCNC: 13 MMOL/L (ref 10–20)
AST SERPL W P-5'-P-CCNC: 22 U/L (ref 9–39)
BILIRUB SERPL-MCNC: 0.5 MG/DL (ref 0–1.2)
BUN SERPL-MCNC: 17 MG/DL (ref 6–23)
CALCIUM SERPL-MCNC: 9.5 MG/DL (ref 8.6–10.6)
CHLORIDE SERPL-SCNC: 98 MMOL/L (ref 98–107)
CHOLEST SERPL-MCNC: 155 MG/DL (ref 0–199)
CHOLESTEROL/HDL RATIO: 3.2
CO2 SERPL-SCNC: 31 MMOL/L (ref 21–32)
CREAT SERPL-MCNC: 0.92 MG/DL (ref 0.5–1.3)
EGFRCR SERPLBLD CKD-EPI 2021: >90 ML/MIN/1.73M*2
ERYTHROCYTE [DISTWIDTH] IN BLOOD BY AUTOMATED COUNT: 12.5 % (ref 11.5–14.5)
EST. AVERAGE GLUCOSE BLD GHB EST-MCNC: 131 MG/DL
GLUCOSE SERPL-MCNC: 119 MG/DL (ref 74–99)
HBA1C MFR BLD: 6.2 %
HCT VFR BLD AUTO: 45.4 % (ref 41–52)
HDLC SERPL-MCNC: 48.4 MG/DL
HGB BLD-MCNC: 14.7 G/DL (ref 13.5–17.5)
LDLC SERPL CALC-MCNC: 94 MG/DL
MCH RBC QN AUTO: 28.9 PG (ref 26–34)
MCHC RBC AUTO-ENTMCNC: 32.4 G/DL (ref 32–36)
MCV RBC AUTO: 89 FL (ref 80–100)
NON HDL CHOLESTEROL: 107 MG/DL (ref 0–149)
NRBC BLD-RTO: 0 /100 WBCS (ref 0–0)
PLATELET # BLD AUTO: 241 X10*3/UL (ref 150–450)
POTASSIUM SERPL-SCNC: 4.7 MMOL/L (ref 3.5–5.3)
PROT SERPL-MCNC: 6.9 G/DL (ref 6.4–8.2)
PSA SERPL-MCNC: 0.37 NG/ML
RBC # BLD AUTO: 5.09 X10*6/UL (ref 4.5–5.9)
SODIUM SERPL-SCNC: 137 MMOL/L (ref 136–145)
TRIGL SERPL-MCNC: 65 MG/DL (ref 0–149)
VIT B12 SERPL-MCNC: 310 PG/ML (ref 211–911)
VLDL: 13 MG/DL (ref 0–40)
WBC # BLD AUTO: 7.3 X10*3/UL (ref 4.4–11.3)

## 2025-01-02 PROCEDURE — 85027 COMPLETE CBC AUTOMATED: CPT

## 2025-01-02 PROCEDURE — 82607 VITAMIN B-12: CPT

## 2025-01-02 PROCEDURE — 83036 HEMOGLOBIN GLYCOSYLATED A1C: CPT

## 2025-01-02 PROCEDURE — 82306 VITAMIN D 25 HYDROXY: CPT

## 2025-01-02 PROCEDURE — 80053 COMPREHEN METABOLIC PANEL: CPT

## 2025-01-02 PROCEDURE — 84153 ASSAY OF PSA TOTAL: CPT

## 2025-01-02 PROCEDURE — 80061 LIPID PANEL: CPT

## 2025-01-08 ENCOUNTER — APPOINTMENT (OUTPATIENT)
Dept: PHARMACY | Facility: HOSPITAL | Age: 65
End: 2025-01-08
Payer: COMMERCIAL

## 2025-01-16 ENCOUNTER — APPOINTMENT (OUTPATIENT)
Dept: PHARMACY | Facility: HOSPITAL | Age: 65
End: 2025-01-16
Payer: COMMERCIAL

## 2025-01-16 DIAGNOSIS — E11.9 TYPE 2 DIABETES MELLITUS WITHOUT COMPLICATION, WITHOUT LONG-TERM CURRENT USE OF INSULIN (MULTI): ICD-10-CM

## 2025-01-16 DIAGNOSIS — E78.5 HYPERLIPIDEMIA, UNSPECIFIED HYPERLIPIDEMIA TYPE: ICD-10-CM

## 2025-01-17 RX ORDER — ATORVASTATIN CALCIUM 10 MG/1
10 TABLET, FILM COATED ORAL DAILY
Qty: 90 TABLET | Refills: 1 | Status: SHIPPED | OUTPATIENT
Start: 2025-01-17

## 2025-01-17 RX ORDER — METFORMIN HYDROCHLORIDE 500 MG/1
500 TABLET ORAL
Qty: 180 TABLET | Refills: 3 | Status: SHIPPED | OUTPATIENT
Start: 2025-01-17 | End: 2026-01-12

## 2025-01-17 ASSESSMENT — ENCOUNTER SYMPTOMS
TREMORS: 1
SWEATS: 1

## 2025-01-17 NOTE — PROGRESS NOTES
[facility] Pharmacy Consult  Haroon Snowden is a 64 y.o. male was referred to Clinical Pharmacy Team for a Pharmacy consult.  The patient was referred for their No chief complaint on file..    Referring Provider: Nicholas Ray,     Subjective   No Known Allergies    [facility] - [place], [place] - [address]  [address]  [facility] [address]  Phone: [phone] Fax: [phone]    [facility]/pharmacy #[id number] - [place], [place] - [address] AT CORNER OF [facility]  [address]  [facility] [address]  Phone: [phone] Fax: [phone]    [facility] - [facility] AT Portal to [facility]  [place] [address]  Phone: [phone] Fax: [phone]      Diabetes  He presents for his follow-up diabetic visit. He has type 2 diabetes mellitus. His disease course has been stable. Hypoglycemia symptoms include sweats and tremors. There are no hypoglycemic complications. Symptoms are stable. Risk factors for coronary artery disease include diabetes mellitus, dyslipidemia and hypertension. He is compliant with treatment most of the time. His weight is fluctuating minimally. His home blood glucose trend is fluctuating minimally. His overall blood glucose range is 130-140 mg/dl.     Reported has been reducing his glipizide to 1 tab daily, half of the time. Reported having hypoglycemia episodes -  1-2 times a week.     CGM data:    Time in range:  High: 5%  In goal:94%  Low: 1 %    Review of Systems   Neurological:  Positive for tremors.       Objective     There were no vitals taken for this visit.     LAB  Lab Results   Component Value Date    BILITOT 0.5 01/02/2025    CALCIUM 9.5 01/02/2025    CO2 31 01/02/2025    CL 98 01/02/2025    CREATININE 0.92 01/02/2025    GLUCOSE 119 (H) 01/02/2025    ALKPHOS 45 01/02/2025    K 4.7 01/02/2025    PROT 6.9 01/02/2025     01/02/2025    AST 22 01/02/2025     ALT 32 01/02/2025    BUN 17 01/02/2025    ANIONGAP 13 01/02/2025    ALBUMIN 4.6 01/02/2025    GFRMALE >90 11/29/2022     Lab Results   Component Value Date    TRIG 65 01/02/2025    CHOL 155 01/02/2025    LDLCALC 94 01/02/2025    HDL 48.4 01/02/2025     Lab Results   Component Value Date    HGBA1C 6.2 (H) 01/02/2025       Current Outpatient Medications on File Prior to Visit   Medication Sig Dispense Refill    atorvastatin (Lipitor) 10 mg tablet Take 1 tablet (10 mg) by mouth once daily. 90 tablet 1    blood sugar diagnostic (Accu-Chek Guide test strips) strip USE TO TEST BLOOD SUGAR TWICE DAILY 100 strip 3    blood-glucose meter (Accu-Chek Guide Glucose Meter) misc Use to test bg 1 each 0    coenzyme Q-10 100 mg capsule Take 1 capsule (100 mg) by mouth once daily.      cyanocobalamin (Vitamin B-12) 1,000 mcg/mL injection Inject 1 mL (1,000 mcg) into the muscle every 30 (thirty) days. 4 mL 0    empagliflozin (Jardiance) 25 mg Take 1 tablet (25 mg) by mouth once daily. 90 tablet 1    flash glucose sensor kit (FreeStyle Med 2 Sensor) kit Use as instructed 1 each 0    FreeStyle Med 2 [place] misc Use as instructed 1 each 0    FreeStyle Med 3 Sensor device Replace every 14 days 6 each 3    glipiZIDE (Glucotrol) 10 mg tablet Take 1 tablet (10 mg) by mouth once daily. 90 tablet 3    metFORMIN (Glucophage) 500 mg tablet Take 1 tablet (500 mg) by mouth 2 times a day after meals. 180 tablet 1     No current facility-administered medications on file prior to visit.      HISTORICAL PHARMACOTHERAPY  -none    DRUG INTERACTIONS  - Having hypoglycemia events    SECONDARY PREVENTION  - Statin? no  - ACE-I/ARB? no    Assessment/Plan   Problem List Items Addressed This Visit       Diabetes mellitus, type 2 (Multi)   Patient reports he has been doing better with decreasing nightly glipizide dose. He will still take a night dose of glipizide if he had a carb heavy meal during the day. He will still have some low readings, but he  doesn't want to stop night dose of glipizide completely yet. Told patient to call back if low readings persist. Will follow up in 3 months for periodic check in.    PLAN:  1. Follow up in 12 weeks     Continue all meds under the continuation of care with the referring provider and clinical pharmacy team.    Margarita Johnson, Sofy     Verbal consent to manage patient's drug therapy was obtained from [the patient and/or an individual authorized to act on behalf of a patient]. They were informed they may decline to participate or withdraw from participation in pharmacy services at any time.

## 2025-01-24 ENCOUNTER — LAB (OUTPATIENT)
Dept: LAB | Facility: LAB | Age: 65
End: 2025-01-24
Payer: COMMERCIAL

## 2025-01-24 ENCOUNTER — APPOINTMENT (OUTPATIENT)
Dept: PRIMARY CARE | Facility: CLINIC | Age: 65
End: 2025-01-24
Payer: COMMERCIAL

## 2025-01-24 VITALS
RESPIRATION RATE: 18 BRPM | DIASTOLIC BLOOD PRESSURE: 86 MMHG | HEART RATE: 65 BPM | SYSTOLIC BLOOD PRESSURE: 123 MMHG | BODY MASS INDEX: 25.62 KG/M2 | WEIGHT: 179 LBS | TEMPERATURE: 97.7 F | HEIGHT: 70 IN

## 2025-01-24 DIAGNOSIS — E11.9 TYPE 2 DIABETES MELLITUS WITHOUT COMPLICATION, WITHOUT LONG-TERM CURRENT USE OF INSULIN (MULTI): ICD-10-CM

## 2025-01-24 DIAGNOSIS — Z00.00 ENCOUNTER FOR HEALTH MAINTENANCE EXAMINATION: ICD-10-CM

## 2025-01-24 DIAGNOSIS — E53.8 VITAMIN B12 DEFICIENCY: Primary | ICD-10-CM

## 2025-01-24 DIAGNOSIS — E78.2 HYPERLIPIDEMIA, MIXED: ICD-10-CM

## 2025-01-24 LAB
CREAT UR-MCNC: 79.7 MG/DL (ref 20–370)
MICROALBUMIN UR-MCNC: <7 MG/L
MICROALBUMIN/CREAT UR: NORMAL MG/G{CREAT}

## 2025-01-24 PROCEDURE — 99396 PREV VISIT EST AGE 40-64: CPT | Performed by: FAMILY MEDICINE

## 2025-01-24 PROCEDURE — 82043 UR ALBUMIN QUANTITATIVE: CPT

## 2025-01-24 PROCEDURE — 82570 ASSAY OF URINE CREATININE: CPT

## 2025-01-24 NOTE — RESULT ENCOUNTER NOTE
Tom Patiño,    Your urine screening for sugar was excellent.  Will recheck it again in a year.    Have a Great Day and Weekend!!!    Dr. Ray

## 2025-01-24 NOTE — PROGRESS NOTES
"Subjective   Patient ID: Haroon Snowden is a 64 y.o. male who presents for Annual Exam.    HPI  Patient comes in today for annual physical exam.  He had his labs done earlier in the week all of which looked good other than his B12 was slightly low.  He is now working on oral replacement of same.    His only complaint today is recent left Achilles pain but it is significantly improved today and barely noticeable.  At this point we will continue to monitor.  No further workup necessary.    His PHQ-9 score today is 1 which he describes is not difficult at all.      Review of Systems   All other systems reviewed and are negative.      Objective   Vitals:  /86   Pulse 65   Temp 36.5 °C (97.7 °F)   Resp 18   Ht 1.765 m (5' 9.5\")   Wt 81.2 kg (179 lb)   BMI 26.05 kg/m²     Physical Exam  Vitals reviewed.   Constitutional:       General: He is not in acute distress.     Appearance: He is well-developed. He is not diaphoretic.   HENT:      Head: Normocephalic and atraumatic.      Right Ear: Tympanic membrane, ear canal and external ear normal.      Left Ear: Tympanic membrane, ear canal and external ear normal.      Nose: Nose normal.      Mouth/Throat:      Mouth: Mucous membranes are moist.      Pharynx: Oropharynx is clear.   Eyes:      General: No scleral icterus.     Extraocular Movements: Extraocular movements intact.      Conjunctiva/sclera: Conjunctivae normal.      Pupils: Pupils are equal, round, and reactive to light.      Comments: Patient wears glasses   Neck:      Thyroid: No thyromegaly.      Vascular: No JVD.   Cardiovascular:      Rate and Rhythm: Normal rate and regular rhythm.      Heart sounds: Normal heart sounds. No murmur heard.     No friction rub. No gallop.   Pulmonary:      Effort: Pulmonary effort is normal. No respiratory distress.      Breath sounds: Normal breath sounds. No wheezing or rales.   Chest:      Chest wall: No tenderness.   Abdominal:      General: Abdomen is flat. Bowel " sounds are normal. There is no distension.      Palpations: Abdomen is soft. There is no mass.      Tenderness: There is no abdominal tenderness. There is no rebound.   Genitourinary:     Penis: Normal.       Testes: Normal.   Musculoskeletal:         General: No swelling, tenderness or deformity. Normal range of motion.      Cervical back: Normal range of motion and neck supple.   Lymphadenopathy:      Cervical: No cervical adenopathy.   Skin:     General: Skin is warm and dry.      Coloration: Skin is not jaundiced.      Findings: No bruising or rash.   Neurological:      General: No focal deficit present.      Mental Status: He is alert and oriented to person, place, and time. Mental status is at baseline.      Cranial Nerves: No cranial nerve deficit.      Sensory: No sensory deficit.      Motor: No weakness.      Coordination: Coordination normal.      Gait: Gait normal.      Deep Tendon Reflexes: Reflexes normal.   Psychiatric:         Mood and Affect: Mood normal.         Behavior: Behavior normal.         Thought Content: Thought content normal.         Judgment: Judgment normal.         Assessment/Plan   Problem List Items Addressed This Visit       Diabetes mellitus, type 2 (Multi)    Relevant Medications    empagliflozin (Jardiance) 25 mg    Hyperlipidemia, mixed    Vitamin B12 deficiency - Primary    Relevant Orders    Vitamin B12    Encounter for health maintenance examination   Recheck B12 in April.  Continue current meds as directed.  Follow up in 6 months for recheck if all remains stable, sooner if problems arise.  Medication list reconciled.  Thank you for visiting today!      Professional services: Some of this note was completed using Dragon voice technology and sometimes the software misinterprets words. This may include unintended errors with respect to translation of words, typographical errors or grammar errors which may not have been identified prior to finalization of the chart note. Please  take this into account when reading the note. Thank you.

## 2025-03-24 DIAGNOSIS — E11.9 TYPE 2 DIABETES MELLITUS WITHOUT COMPLICATION, WITHOUT LONG-TERM CURRENT USE OF INSULIN: ICD-10-CM

## 2025-03-24 RX ORDER — GLIPIZIDE 10 MG/1
10 TABLET ORAL
Qty: 180 TABLET | Refills: 1 | Status: SHIPPED | OUTPATIENT
Start: 2025-03-24

## 2025-03-24 NOTE — TELEPHONE ENCOUNTER
Pt last OV 1/24/2025    Requested Prescriptions     Pending Prescriptions Disp Refills    glipiZIDE (Glucotrol) 10 mg tablet [Pharmacy Med Name: glipizide 10 mg tablet] 180 tablet 1     Sig: TAKE 1 TABLET BY MOUTH TWICE DAILY before meals

## 2025-03-24 NOTE — TELEPHONE ENCOUNTER
Patient also called in this morning to request this medication.  Patient can be reached at [phone].        Thank You

## 2025-04-17 ENCOUNTER — APPOINTMENT (OUTPATIENT)
Dept: PHARMACY | Facility: HOSPITAL | Age: 65
End: 2025-04-17
Payer: COMMERCIAL

## 2025-04-17 DIAGNOSIS — E11.9 TYPE 2 DIABETES MELLITUS WITHOUT COMPLICATION, WITHOUT LONG-TERM CURRENT USE OF INSULIN: ICD-10-CM

## 2025-04-17 DIAGNOSIS — E78.5 HYPERLIPIDEMIA, UNSPECIFIED HYPERLIPIDEMIA TYPE: ICD-10-CM

## 2025-04-21 RX ORDER — BLOOD SUGAR DIAGNOSTIC
STRIP MISCELLANEOUS
Qty: 100 STRIP | Refills: 3 | Status: SHIPPED | OUTPATIENT
Start: 2025-04-21

## 2025-04-21 ASSESSMENT — ENCOUNTER SYMPTOMS
TREMORS: 1
SWEATS: 1

## 2025-04-21 NOTE — PROGRESS NOTES
[facility] 610 Pharmacy Consult  Haroon Snowden is a 65 y.o. male was referred to Clinical Pharmacy Team for a Pharmacy consult.  The patient was referred for their No chief complaint on file..    Referring Provider: Nicholas Ray,     Subjective   No Known Allergies    [facility] - [place], [place] - [address]  [address]  [facility] [address]  Phone: [phone] Fax: [phone]    [facility]/pharmacy #[id number] - [place], [place] - [address] AT CORNER OF [facility]  [address]  [facility] [address]  Phone: [phone] Fax: [phone]    [facility] - [facility] AT Portal to [facility]  [place] [address]  Phone: [phone] Fax: [phone]      Diabetes  He presents for his follow-up diabetic visit. He has type 2 diabetes mellitus. His disease course has been stable. Hypoglycemia symptoms include sweats and tremors. There are no hypoglycemic complications. Symptoms are stable. Risk factors for coronary artery disease include diabetes mellitus, dyslipidemia and hypertension. He is compliant with treatment most of the time. His weight is fluctuating minimally. His home blood glucose trend is fluctuating minimally. His overall blood glucose range is 130-140 mg/dl.     Reported has been reducing his glipizide to 1 tab daily, half of the time. Reported having hypoglycemia episodes -  1-2 times a week.     CGM data:    Estimated GMI 6%    Review of Systems   Neurological:  Positive for tremors.     Objective     LAB  Lab Results   Component Value Date    BILITOT 0.5 01/02/2025    CALCIUM 9.5 01/02/2025    CO2 31 01/02/2025    CL 98 01/02/2025    CREATININE 0.92 01/02/2025    GLUCOSE 119 (H) 01/02/2025    ALKPHOS 45 01/02/2025    K 4.7 01/02/2025    PROT 6.9 01/02/2025     01/02/2025    AST 22 01/02/2025    ALT 32 01/02/2025    BUN 17 01/02/2025    ANIONGAP 13 01/02/2025    ALBUMIN 4.6  01/02/2025    GFRMALE >90 11/29/2022     Lab Results   Component Value Date    TRIG 65 01/02/2025    CHOL 155 01/02/2025    LDLCALC 94 01/02/2025    HDL 48.4 01/02/2025     Lab Results   Component Value Date    HGBA1C 6.2 (H) 01/02/2025       Current Outpatient Medications on File Prior to Visit   Medication Sig Dispense Refill    atorvastatin (Lipitor) 10 mg tablet Take 1 tablet (10 mg) by mouth once daily. 90 tablet 1    blood sugar diagnostic (Accu-Chek Guide test strips) strip USE TO TEST BLOOD SUGAR TWICE DAILY 100 strip 3    blood-glucose meter (Accu-Chek Guide Glucose Meter) misc Use to test bg 1 each 0    coenzyme Q-10 100 mg capsule Take 1 capsule (100 mg) by mouth once daily.      empagliflozin (Jardiance) 25 mg Take 1 tablet (25 mg) by mouth once daily. 90 tablet 1    flash glucose sensor kit (FreeStyle Med 2 Sensor) kit Use as instructed 1 each 0    FreeStyle Med 2 [place] misc Use as instructed 1 each 0    FreeStyle Med 3 Sensor device Replace every 14 days 6 each 3    glipiZIDE (Glucotrol) 10 mg tablet TAKE 1 TABLET BY MOUTH TWICE DAILY before meals 180 tablet 1    metFORMIN (Glucophage) 500 mg tablet Take 1 tablet (500 mg) by mouth 2 times a day after meals. 180 tablet 3     No current facility-administered medications on file prior to visit.      HISTORICAL PHARMACOTHERAPY  -none    DRUG INTERACTIONS  - Having hypoglycemia events    SECONDARY PREVENTION  - Statin? no  - ACE-I/ARB? no    Assessment/Plan   Problem List Items Addressed This Visit       Diabetes mellitus, type 2 (Multi)     Other Visit Diagnoses         Hyperlipidemia, unspecified hyperlipidemia type            Patient reports he has is using glipizide once daily in the am and prn at night depending on what he is eating. He still reports some low BG readings. Discussed trying to switch him to a GLP-1 to help prevent low readings and he is not willing to try medication at this time. Will follow up in 12 weeks to review  A1C.    PLAN:  1. Follow up in 12 weeks     Continue all meds under the continuation of care with the referring provider and clinical pharmacy team.    Margarita Johnson PharmD     Verbal consent to manage patient's drug therapy was obtained from [the patient and/or an individual authorized to act on behalf of a patient]. They were informed they may decline to participate or withdraw from participation in pharmacy services at any time.

## 2025-05-21 DIAGNOSIS — E11.9 TYPE 2 DIABETES MELLITUS WITHOUT COMPLICATION, WITHOUT LONG-TERM CURRENT USE OF INSULIN: ICD-10-CM

## 2025-05-21 RX ORDER — BLOOD-GLUCOSE SENSOR
EACH MISCELLANEOUS
Qty: 6 EACH | Refills: 3 | Status: SHIPPED | OUTPATIENT
Start: 2025-05-21

## 2025-05-21 NOTE — TELEPHONE ENCOUNTER
Rx Refill Request Telephone Encounter    Name:  Haroon Snowden  :  [id number]  Medication Name:  FreeStyle Med 3 Sensor device     Specific Pharmacy location:    [facility] [address] [place] [address] [place] Phone: [phone]   Fax: [phone]        Date of last appointment:  25  Date of next appointment:  25  Best number to reach patient:  [phone]          Thank You

## 2025-05-21 NOTE — TELEPHONE ENCOUNTER
Requested Prescriptions     Pending Prescriptions Disp Refills    FreeStyle Med 3 Sensor device 6 each 3     Sig: Replace every 14 days

## 2025-07-24 ENCOUNTER — APPOINTMENT (OUTPATIENT)
Dept: PRIMARY CARE | Facility: CLINIC | Age: 65
End: 2025-07-24
Payer: COMMERCIAL

## 2025-07-24 LAB — VIT B12 SERPL-MCNC: 881 PG/ML (ref 200–1100)

## 2025-07-25 ENCOUNTER — APPOINTMENT (OUTPATIENT)
Dept: PHARMACY | Facility: HOSPITAL | Age: 65
End: 2025-07-25
Payer: COMMERCIAL

## 2025-07-25 DIAGNOSIS — E11.9 TYPE 2 DIABETES MELLITUS WITHOUT COMPLICATION, WITHOUT LONG-TERM CURRENT USE OF INSULIN: Primary | ICD-10-CM

## 2025-07-25 LAB
EST. AVERAGE GLUCOSE BLD GHB EST-MCNC: 137 MG/DL
EST. AVERAGE GLUCOSE BLD GHB EST-SCNC: 7.6 MMOL/L
HBA1C MFR BLD: 6.4 %

## 2025-07-29 RX ORDER — GLIPIZIDE 5 MG/1
5 TABLET ORAL
Qty: 180 TABLET | Refills: 1 | Status: SHIPPED | OUTPATIENT
Start: 2025-07-29 | End: 2026-07-29

## 2025-07-29 ASSESSMENT — ENCOUNTER SYMPTOMS
TREMORS: 1
SWEATS: 1

## 2025-07-29 NOTE — PROGRESS NOTES
[facility] Pharmacy Consult  Haroon Snowden is a 65 y.o. male was referred to Clinical Pharmacy Team for a Pharmacy consult.  The patient was referred for their No chief complaint on file..    Referring Provider: Nicholas Ray,     Subjective   No Known Allergies    [facility] - [place], [place] - [address]  [address]  [facility] [address]  Phone: [phone] Fax: [phone]    [facility]/pharmacy #[id number] - [place], [place] - [address] AT [address]NER OF [facility]  [address]  [facility] [address]  Phone: [phone] Fax: [phone]    [facility] [place] - [facility] AT Portal to [facility]  [place] [address]  Phone: [phone] Fax: [phone]      Diabetes  He presents for his follow-up diabetic visit. He has type 2 diabetes mellitus. His disease course has been stable. Hypoglycemia symptoms include sweats and tremors. There are no hypoglycemic complications. Symptoms are stable. Risk factors for coronary artery disease include diabetes mellitus, dyslipidemia and hypertension. He is compliant with treatment most of the time. His weight is fluctuating minimally. His home blood glucose trend is fluctuating minimally. His overall blood glucose range is 130-140 mg/dl.     Reported has been reducing his glipizide to 1 tab daily, half of the time. Reported having hypoglycemia episodes -  1-2 times a week.     CGM data:    Estimated GMI 6%    Review of Systems   Neurological:  Positive for tremors.     Objective     LAB  Lab Results   Component Value Date    BILITOT 0.5 01/02/2025    CALCIUM 9.5 01/02/2025    CO2 31 01/02/2025    CL 98 01/02/2025    CREATININE 0.92 01/02/2025    GLUCOSE 119 (H) 01/02/2025    ALKPHOS 45 01/02/2025    K 4.7 01/02/2025    PROT 6.9 01/02/2025     01/02/2025    AST 22 01/02/2025    ALT 32 01/02/2025    BUN 17 01/02/2025    ANIONGAP 13 01/02/2025    ALBUMIN 4.6  01/02/2025    GFRMALE >90 11/29/2022     Lab Results   Component Value Date    TRIG 65 01/02/2025    CHOL 155 01/02/2025    LDLCALC 94 01/02/2025    HDL 48.4 01/02/2025     Lab Results   Component Value Date    HGBA1C 6.4 (H) 07/24/2025       Current Outpatient Medications on File Prior to Visit   Medication Sig Dispense Refill    atorvastatin (Lipitor) 10 mg tablet Take 1 tablet (10 mg) by mouth once daily. 90 tablet 1    blood sugar diagnostic (Accu-Chek Guide test strips) USE TO TEST BLOOD SUGAR TWICE DAILY 100 strip 3    blood-glucose meter (Accu-Chek Guide Glucose Meter) misc Use to test bg 1 each 0    coenzyme Q-10 100 mg capsule Take 1 capsule (100 mg) by mouth once daily.      flash glucose sensor kit (FreeStyle Med 2 Sensor) kit Use as instructed 1 each 0    FreeStyle Med 2 [place] misc Use as instructed 1 each 0    FreeStyle Med 3 Sensor device Replace every 14 days 6 each 3    glipiZIDE (Glucotrol) 10 mg tablet TAKE 1 TABLET BY MOUTH TWICE DAILY before meals 180 tablet 1    metFORMIN (Glucophage) 500 mg tablet Take 1 tablet (500 mg) by mouth 2 times a day after meals. 180 tablet 3    [DISCONTINUED] empagliflozin (Jardiance) 25 mg Take 1 tablet (25 mg) by mouth once daily. 90 tablet 1     No current facility-administered medications on file prior to visit.      HISTORICAL PHARMACOTHERAPY  -none    DRUG INTERACTIONS  - Having hypoglycemia events    SECONDARY PREVENTION  - Statin? no  - ACE-I/ARB? no    Assessment/Plan   Patient reports he is still using glipizide 10 mg once daily in the am and prn at night depending on what he is eating. He still does not want to consider other medications at this time. Discussed decreasing dose to 5 mg bid to help prevent low readings and he is agreeable to trying. Will follow up in 12 weeks to review A1C.    PLAN:  1. Follow up in 12 weeks     Continue all meds under the continuation of care with the referring provider and clinical pharmacy team.    Margarita Johnson,  PharmD     Verbal consent to manage patient's drug therapy was obtained from [the patient and/or an individual authorized to act on behalf of a patient]. They were informed they may decline to participate or withdraw from participation in pharmacy services at any time.

## 2025-08-08 ENCOUNTER — APPOINTMENT (OUTPATIENT)
Dept: PRIMARY CARE | Facility: CLINIC | Age: 65
End: 2025-08-08
Payer: COMMERCIAL

## 2025-08-08 VITALS
TEMPERATURE: 97.5 F | BODY MASS INDEX: 25.88 KG/M2 | OXYGEN SATURATION: 94 % | SYSTOLIC BLOOD PRESSURE: 119 MMHG | DIASTOLIC BLOOD PRESSURE: 77 MMHG | WEIGHT: 177.8 LBS | HEART RATE: 86 BPM | RESPIRATION RATE: 16 BRPM

## 2025-08-08 DIAGNOSIS — E53.8 VITAMIN B12 DEFICIENCY: ICD-10-CM

## 2025-08-08 DIAGNOSIS — E11.9 TYPE 2 DIABETES MELLITUS WITHOUT COMPLICATION, WITHOUT LONG-TERM CURRENT USE OF INSULIN: Primary | ICD-10-CM

## 2025-08-08 DIAGNOSIS — E78.2 HYPERLIPIDEMIA, MIXED: ICD-10-CM

## 2025-08-08 PROCEDURE — 99213 OFFICE O/P EST LOW 20 MIN: CPT | Performed by: FAMILY MEDICINE

## 2025-08-08 ASSESSMENT — ENCOUNTER SYMPTOMS
DEPRESSION: 0
LOSS OF SENSATION IN FEET: 0
OCCASIONAL FEELINGS OF UNSTEADINESS: 0

## 2025-08-08 ASSESSMENT — PATIENT HEALTH QUESTIONNAIRE - PHQ9
SUM OF ALL RESPONSES TO PHQ9 QUESTIONS 1 AND 2: 0
1. LITTLE INTEREST OR PLEASURE IN DOING THINGS: NOT AT ALL
2. FEELING DOWN, DEPRESSED OR HOPELESS: NOT AT ALL

## 2025-08-08 NOTE — PROGRESS NOTES
Subjective   Patient ID: Haroon Snowden is a 65 y.o. male who presents for Follow-up (6 mo med fu. No questions, concerns, or complaints. /).    HPI  Patient presents today for 6-month checkup and refill of meds.  He states that he is taking his medicines as directed and is not having any side effects from them. He currently is without complaints.  He had recent lab work done and all looks well.  His hemoglobin A1c was 6.4 and his B12 level 881.    He is excited about taking his 12-year-old grandson to [employer] next week.    Review of Systems   All other systems reviewed and are negative.      Objective   Vitals:  /77   Pulse 86   Temp 36.4 °C (97.5 °F) (Temporal)   Resp 16   Wt 80.6 kg (177 lb 12.8 oz)   SpO2 94%   BMI 25.88 kg/m²     Physical Exam  Vitals reviewed.   Constitutional:       General: He is not in acute distress.     Appearance: He is well-developed. He is not diaphoretic.   HENT:      Head: Normocephalic and atraumatic.      Right Ear: Tympanic membrane, ear canal and external ear normal.      Left Ear: Tympanic membrane, ear canal and external ear normal.      Nose: Nose normal.      Mouth/Throat:      Mouth: Mucous membranes are moist.      Pharynx: Oropharynx is clear.     Eyes:      General: No scleral icterus.     Extraocular Movements: Extraocular movements intact.      Conjunctiva/sclera: Conjunctivae normal.      Pupils: Pupils are equal, round, and reactive to light.      Comments: Patient wears glasses   Neck:      Thyroid: No thyromegaly.      Vascular: No JVD.     Cardiovascular:      Rate and Rhythm: Normal rate and regular rhythm.      Heart sounds: Normal heart sounds. No murmur heard.     No friction rub. No gallop.   Pulmonary:      Effort: Pulmonary effort is normal. No respiratory distress.      Breath sounds: Normal breath sounds. No wheezing or rales.   Chest:      Chest wall: No tenderness.   Abdominal:      General: Abdomen is flat. Bowel sounds are normal.  There is no distension.      Palpations: Abdomen is soft. There is no mass.      Tenderness: There is no abdominal tenderness. There is no rebound.   Genitourinary:     Penis: Normal.       Testes: Normal.     Musculoskeletal:         General: No swelling, tenderness or deformity. Normal range of motion.      Cervical back: Normal range of motion and neck supple.   Lymphadenopathy:      Cervical: No cervical adenopathy.     Skin:     General: Skin is warm and dry.      Coloration: Skin is not jaundiced.      Findings: No bruising or rash.     Neurological:      General: No focal deficit present.      Mental Status: He is alert and oriented to person, place, and time. Mental status is at baseline.      Cranial Nerves: No cranial nerve deficit.      Sensory: No sensory deficit.      Motor: No weakness.      Coordination: Coordination normal.      Gait: Gait normal.      Deep Tendon Reflexes: Reflexes normal.     Psychiatric:         Mood and Affect: Mood normal.         Behavior: Behavior normal.         Thought Content: Thought content normal.         Judgment: Judgment normal.         CBC -  Recent Labs     01/02/25  0807 12/18/23  0809 11/29/22  0715   WBC 7.3 8.1 9.5   HGB 14.7 14.0 14.0   HCT 45.4 41.1 41.7    269 237   MCV 89 89 88       CMP -  Recent Labs     01/02/25  0807 04/04/24  1223 12/18/23  0809    133* 133*   K 4.7 4.9 5.4*   CL 98 96* 98   CO2 31 31 31   ANIONGAP 13 11 9*   BUN 17 10 12   CREATININE 0.92 0.81 0.88   EGFR >90 >90 >90     Recent Labs     01/02/25  0807 04/04/24  1223 12/18/23  0809   ALBUMIN 4.6 4.4 4.5   ALKPHOS 45 48 41   ALT 32 28 34   AST 22 24 27   BILITOT 0.5 0.4 0.5       LIPID PANEL -   Recent Labs     01/02/25  0807 12/18/23  0809 11/29/22  0715 11/10/21  0941 11/02/20  0954   CHOL 155 156 121 105 127   LDLCALC 94 96  --   --   --    LDLF  --   --  65 49 73   HDL 48.4 47.1 43.1 45.0 40.0   TRIG 65 65 63 56 72       Recent Labs     07/24/25  0742 01/02/25  0807  09/26/24  1011   HGBA1C 6.4* 6.2* 6.1*       Lab Results   Component Value Date    TNTEZJDL15 881 07/24/2025       Lab Results   Component Value Date    VITD25 76 01/02/2025        Assessment/Plan   Problem List Items Addressed This Visit       Diabetes mellitus, type 2 (Multi) - Primary    Hyperlipidemia, mixed    Vitamin B12 deficiency   Continue current meds as directed.  Follow up in 5 months for recheck if all remains stable, sooner if problems arise.     Patient exhibiting no signs of depression and does not need treatment at this time.  Medication list reconciled.  Thank you for visiting today!        Professional services: Some of this note was completed using Dragon voice technology and sometimes the software misinterprets words. This may include unintended errors with respect to translation of words, typographical errors or grammar errors which may not have been identified prior to finalization of the chart note. Please take this into account when reading the note. Thank you.              Nicholas Ray DO 08/08/25 3:45 PM

## 2025-10-24 ENCOUNTER — APPOINTMENT (OUTPATIENT)
Dept: PHARMACY | Facility: HOSPITAL | Age: 65
End: 2025-10-24
Payer: COMMERCIAL

## 2026-01-26 ENCOUNTER — APPOINTMENT (OUTPATIENT)
Dept: PRIMARY CARE | Facility: CLINIC | Age: 66
End: 2026-01-26
Payer: COMMERCIAL